# Patient Record
Sex: MALE
[De-identification: names, ages, dates, MRNs, and addresses within clinical notes are randomized per-mention and may not be internally consistent; named-entity substitution may affect disease eponyms.]

---

## 2017-02-13 ENCOUNTER — DIAGNOSTIC TRANS (OUTPATIENT)
Dept: OTHER | Age: 52
End: 2017-02-13

## 2017-02-13 ENCOUNTER — HOSPITAL (OUTPATIENT)
Dept: OTHER | Age: 52
End: 2017-02-13
Attending: HOSPITALIST

## 2017-02-13 LAB
ANALYZER ANC (IANC): ABNORMAL
ANION GAP SERPL CALC-SCNC: 13 MMOL/L (ref 10–20)
BASOPHILS # BLD: 0 THOUSAND/MCL (ref 0–0.3)
BASOPHILS NFR BLD: 0 %
BUN SERPL-MCNC: 17 MG/DL (ref 10–20)
BUN/CREAT SERPL: 19 (ref 7–25)
CALCIUM SERPL-MCNC: 8.9 MG/DL (ref 8.4–10.2)
CHLORIDE: 104 MMOL/L (ref 98–107)
CO2 SERPL-SCNC: 25 MMOL/L (ref 21–32)
CREAT SERPL-MCNC: 0.91 MG/DL (ref 0.67–1.17)
D DIMER PPP FEU-MCNC: 0.24 MG/L FEU
DIFFERENTIAL METHOD BLD: ABNORMAL
EOSINOPHIL # BLD: 0.2 THOUSAND/MCL (ref 0.1–0.5)
EOSINOPHIL NFR BLD: 2 %
ERYTHROCYTE [DISTWIDTH] IN BLOOD: 13.8 % (ref 11–15)
GLUCOSE BLDC GLUCOMTR-MCNC: 168 MG/DL (ref 65–99)
GLUCOSE BLDC GLUCOMTR-MCNC: 187 MG/DL (ref 65–99)
GLUCOSE BLDC GLUCOMTR-MCNC: 54 MG/DL (ref 65–99)
GLUCOSE BLDC GLUCOMTR-MCNC: 86 MG/DL (ref 65–99)
GLUCOSE SERPL-MCNC: 350 MG/DL (ref 65–99)
HEMATOCRIT: 44 % (ref 39–51)
HGB BLD-MCNC: 14.8 GM/DL (ref 13–17)
LYMPHOCYTES # BLD: 1.9 THOUSAND/MCL (ref 1–4)
LYMPHOCYTES NFR BLD: 27 %
MCH RBC QN AUTO: 34.6 PG (ref 26–34)
MCHC RBC AUTO-ENTMCNC: 33.6 GM/DL (ref 32–36.5)
MCV RBC AUTO: 102.8 FL (ref 78–100)
MONOCYTES # BLD: 0.6 THOUSAND/MCL (ref 0.3–0.9)
MONOCYTES NFR BLD: 8 %
NEUTROPHILS # BLD: 4.5 THOUSAND/MCL (ref 1.8–7.7)
NEUTROPHILS NFR BLD: 63 %
NEUTS SEG NFR BLD: ABNORMAL %
PERCENT NRBC: ABNORMAL
PLATELET # BLD: 168 THOUSAND/MCL (ref 140–450)
POTASSIUM SERPL-SCNC: 5 MMOL/L (ref 3.4–5.1)
RBC # BLD: 4.28 MILLION/MCL (ref 4.5–5.9)
SODIUM SERPL-SCNC: 137 MMOL/L (ref 135–145)
TROPONIN I SERPL HS-MCNC: <0.02 NG/ML
TROPONIN I SERPL HS-MCNC: <0.02 NG/ML
WBC # BLD: 7.2 THOUSAND/MCL (ref 4.2–11)

## 2017-02-14 ENCOUNTER — CHARTING TRANS (OUTPATIENT)
Dept: OTHER | Age: 52
End: 2017-02-14

## 2017-02-14 LAB
ANION GAP SERPL CALC-SCNC: 15 MMOL/L (ref 10–20)
BUN SERPL-MCNC: 18 MG/DL (ref 10–20)
BUN/CREAT SERPL: 21 (ref 7–25)
CALCIUM SERPL-MCNC: 8.8 MG/DL (ref 8.4–10.2)
CHLORIDE: 103 MMOL/L (ref 98–107)
CO2 SERPL-SCNC: 23 MMOL/L (ref 21–32)
CREAT SERPL-MCNC: 0.87 MG/DL (ref 0.67–1.17)
GLUCOSE BLDC GLUCOMTR-MCNC: 217 MG/DL (ref 65–99)
GLUCOSE BLDC GLUCOMTR-MCNC: 263 MG/DL (ref 65–99)
GLUCOSE SERPL-MCNC: 323 MG/DL (ref 65–99)
POTASSIUM SERPL-SCNC: 4.9 MMOL/L (ref 3.4–5.1)
SODIUM SERPL-SCNC: 136 MMOL/L (ref 135–145)
TROPONIN I SERPL HS-MCNC: <0.02 NG/ML

## 2017-08-08 ENCOUNTER — APPOINTMENT (OUTPATIENT)
Dept: GENERAL RADIOLOGY | Facility: HOSPITAL | Age: 52
End: 2017-08-08
Attending: EMERGENCY MEDICINE
Payer: MEDICARE

## 2017-08-08 ENCOUNTER — HOSPITAL ENCOUNTER (EMERGENCY)
Facility: HOSPITAL | Age: 52
Discharge: HOME | End: 2017-08-08
Attending: EMERGENCY MEDICINE
Payer: MEDICARE

## 2017-08-08 VITALS
OXYGEN SATURATION: 98 % | HEIGHT: 68 IN | DIASTOLIC BLOOD PRESSURE: 79 MMHG | SYSTOLIC BLOOD PRESSURE: 133 MMHG | HEART RATE: 89 BPM | RESPIRATION RATE: 16 BRPM | WEIGHT: 166 LBS | BODY MASS INDEX: 25.16 KG/M2

## 2017-08-08 DIAGNOSIS — S82.142A TIBIAL PLATEAU FRACTURE, LEFT, CLOSED, INITIAL ENCOUNTER: Primary | ICD-10-CM

## 2017-08-08 PROCEDURE — 73562 X-RAY EXAM OF KNEE 3: CPT | Performed by: EMERGENCY MEDICINE

## 2017-08-08 PROCEDURE — 99284 EMERGENCY DEPT VISIT MOD MDM: CPT

## 2017-08-08 PROCEDURE — 73610 X-RAY EXAM OF ANKLE: CPT | Performed by: EMERGENCY MEDICINE

## 2017-08-08 PROCEDURE — 99283 EMERGENCY DEPT VISIT LOW MDM: CPT

## 2017-08-08 PROCEDURE — 73630 X-RAY EXAM OF FOOT: CPT | Performed by: EMERGENCY MEDICINE

## 2017-08-08 RX ORDER — ACYCLOVIR 200 MG/1
200 CAPSULE ORAL 2 TIMES DAILY
COMMUNITY
End: 2021-03-22 | Stop reason: CLARIF

## 2017-08-08 RX ORDER — HYDROCODONE BITARTRATE AND ACETAMINOPHEN 5; 325 MG/1; MG/1
1 TABLET ORAL ONCE
Status: COMPLETED | OUTPATIENT
Start: 2017-08-08 | End: 2017-08-08

## 2017-08-08 RX ORDER — VENLAFAXINE 37.5 MG/1
37.5 TABLET ORAL NIGHTLY
Status: ON HOLD | COMMUNITY
End: 2021-03-23

## 2017-08-08 RX ORDER — PREDNISONE 1 MG/1
5 TABLET ORAL DAILY
COMMUNITY

## 2017-08-08 RX ORDER — HYDROCODONE BITARTRATE AND ACETAMINOPHEN 5; 325 MG/1; MG/1
1 TABLET ORAL EVERY 4 HOURS PRN
Qty: 20 TABLET | Refills: 0 | Status: SHIPPED | OUTPATIENT
Start: 2017-08-08 | End: 2017-08-18

## 2017-08-09 NOTE — ED PROVIDER NOTES
Patient Seen in: [facility] Emergency Department    History   Patient presents with:  Trauma (cardiovascular, musculoskeletal)    Stated Complaint: fell down approx 2 stairs, left knee/ankle/foot pain. No loc.      HPI    Patient is a pleasant 51-ye REFERRAL      Comment: hiat hernia/schatzki's ring. no hpylori. mild                duodenitis    Medications :   Dolutegravir Sodium (TIVICAY OR),  Take by mouth. acyclovir 200 MG Oral Cap,  Take 200 mg by mouth every 4 (four) hours while awake.    predn  during surgery when patient was 8   • Diabetes Sister    • Diabetes Brother        Smoking status: Never Smoker                                                              Smokeless tobacco: Never Used                      Alcohol use:  No Ankle (min 3 Views), Left (cpt=73610)    Result Date: 8/8/2017  PROCEDURE:  XR ANKLE (MIN 3 VIEWS), LEFT (CPT=73610)  TECHNIQUE:  Three views were obtained. COMPARISON:  None. INDICATIONS:  fell down approx 2 stairs, left knee/ankle/foot pain. No loc. of left knee pain. FINDINGS:   Suspicion for subtle fracture involving the proximal tibia.  On the lateral view, there is vertically oriented linear lucency projecting over the proximal posterior shaft of the tibia, and possible lucency over the tibial tomorrow. Call in AM for appointment      Medications Prescribed:  Current Discharge Medication List    START taking these medications    HYDROcodone-acetaminophen 5-325 MG Oral Tab  Take 1 tablet by mouth every 4 (four) hours as needed for Pain.   Qty: 20

## 2017-08-09 NOTE — ED NOTES
Knee immobilizer applied to left knee. Pt tolerated well. Pt assisted into wheelchair and assisted into car. D/C instructions reviewed, no questions.

## 2017-08-09 NOTE — ED INITIAL ASSESSMENT (HPI)
Pt states 30min prior to arrival, pt misstepped onto stairs and came down on left leg. Pt states he did not fall and no LOC. Pt c/o pain to left foot and knee.

## 2017-08-24 ENCOUNTER — APPOINTMENT (OUTPATIENT)
Dept: GENERAL RADIOLOGY | Facility: HOSPITAL | Age: 52
End: 2017-08-24
Attending: EMERGENCY MEDICINE
Payer: MEDICARE

## 2017-08-24 ENCOUNTER — HOSPITAL ENCOUNTER (EMERGENCY)
Facility: HOSPITAL | Age: 52
Discharge: HOME | End: 2017-08-24
Attending: EMERGENCY MEDICINE
Payer: MEDICARE

## 2017-08-24 VITALS
HEIGHT: 68 IN | OXYGEN SATURATION: 100 % | TEMPERATURE: 98 F | BODY MASS INDEX: 24.25 KG/M2 | WEIGHT: 160 LBS | HEART RATE: 82 BPM | RESPIRATION RATE: 18 BRPM | SYSTOLIC BLOOD PRESSURE: 146 MMHG | DIASTOLIC BLOOD PRESSURE: 86 MMHG

## 2017-08-24 DIAGNOSIS — S82.142D CLOSED FRACTURE OF LEFT TIBIAL PLATEAU WITH ROUTINE HEALING, SUBSEQUENT ENCOUNTER: ICD-10-CM
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
## 2017-08-24 PROCEDURE — 99283 EMERGENCY DEPT VISIT LOW MDM: CPT

## 2017-08-24 PROCEDURE — 73560 X-RAY EXAM OF KNEE 1 OR 2: CPT | Performed by: EMERGENCY MEDICINE

## 2017-08-24 NOTE — ED PROVIDER NOTES
Patient Seen in: [facility] Emergency Department    History   Patient presents with:  Lower Extremity Injury (musculoskeletal)    Stated Complaint:     HPI    26-year-old male who has a kidney transplant and 2 weeks ago suffered a left tibial platea duodenitis    Medications :   Dolutegravir Sodium (TIVICAY OR),  Take by mouth. acyclovir 200 MG Oral Cap,  Take 200 mg by mouth every 4 (four) hours while awake. predniSONE 5 MG Oral Tab,  Take 5 mg by mouth daily.    Venlafaxine HCl 37.5 MG Oral Used                      Alcohol use: No                Review of Systems    Positive for stated complaint:   Other systems are as noted in HPI. Constitutional and vital signs reviewed. All other systems reviewed and negative except as noted above. Medication List as of 8/24/2017 10:46 AM

## 2017-10-21 ENCOUNTER — HOSPITAL ENCOUNTER (EMERGENCY)
Facility: HOSPITAL | Age: 52
Discharge: HOME | End: 2017-10-21
Attending: EMERGENCY MEDICINE
Payer: MEDICARE

## 2017-10-21 VITALS
OXYGEN SATURATION: 96 % | TEMPERATURE: 98 F | HEART RATE: 69 BPM | WEIGHT: 155 LBS | RESPIRATION RATE: 16 BRPM | DIASTOLIC BLOOD PRESSURE: 78 MMHG | BODY MASS INDEX: 24.33 KG/M2 | SYSTOLIC BLOOD PRESSURE: 128 MMHG | HEIGHT: 67 IN

## 2017-10-21 DIAGNOSIS — R11.2 NAUSEA AND VOMITING IN ADULT: Primary | ICD-10-CM

## 2017-10-21 PROCEDURE — 80053 COMPREHEN METABOLIC PANEL: CPT | Performed by: EMERGENCY MEDICINE

## 2017-10-21 PROCEDURE — 99284 EMERGENCY DEPT VISIT MOD MDM: CPT

## 2017-10-21 PROCEDURE — 83690 ASSAY OF LIPASE: CPT | Performed by: EMERGENCY MEDICINE

## 2017-10-21 PROCEDURE — 85025 COMPLETE CBC W/AUTO DIFF WBC: CPT | Performed by: EMERGENCY MEDICINE

## 2017-10-21 PROCEDURE — 96361 HYDRATE IV INFUSION ADD-ON: CPT

## 2017-10-21 PROCEDURE — 96374 THER/PROPH/DIAG INJ IV PUSH: CPT

## 2017-10-21 RX ORDER — ONDANSETRON 2 MG/ML
4 INJECTION INTRAMUSCULAR; INTRAVENOUS ONCE
Status: COMPLETED | OUTPATIENT
Start: 2017-10-21 | End: 2017-10-21

## 2017-10-21 RX ORDER — ACETAMINOPHEN 500 MG
1000 TABLET ORAL ONCE
Status: COMPLETED | OUTPATIENT
Start: 2017-10-21 | End: 2017-10-21

## 2017-10-21 RX ORDER — MAGNESIUM OXIDE 400 MG (241.3 MG MAGNESIUM) TABLET
TABLET DAILY
COMMUNITY

## 2017-10-21 RX ORDER — ONDANSETRON 4 MG/1
4 TABLET, ORALLY DISINTEGRATING ORAL EVERY 4 HOURS PRN
Qty: 10 TABLET | Refills: 0 | Status: SHIPPED | OUTPATIENT
Start: 2017-10-21 | End: 2017-10-28

## 2017-10-21 NOTE — ED INITIAL ASSESSMENT (HPI)
Pt c/o nausea and 2 episodes of vomiting. Pt states he is unable to keep food down. Pt just finished vanco 1G and cipro 750mg. Pt was being treated for an infection in left leg surgical site. Pt denies fevers, or abd cramping.

## 2017-11-23 ENCOUNTER — HOSPITAL ENCOUNTER (INPATIENT)
Facility: HOSPITAL | Age: 52
LOS: 1 days | Discharge: HOME | DRG: 308 | End: 2017-11-24
Attending: EMERGENCY MEDICINE | Admitting: HOSPITALIST
Payer: MEDICARE

## 2017-11-23 ENCOUNTER — APPOINTMENT (OUTPATIENT)
Dept: GENERAL RADIOLOGY | Facility: HOSPITAL | Age: 52
DRG: 308 | End: 2017-11-23
Attending: EMERGENCY MEDICINE
Payer: MEDICARE

## 2017-11-23 DIAGNOSIS — I48.91 ATRIAL FIBRILLATION WITH RVR (HCC): ICD-10-CM

## 2017-11-23 DIAGNOSIS — E11.22 TYPE 2 DIABETES MELLITUS WITH CHRONIC KIDNEY DISEASE, WITHOUT LONG-TERM CURRENT USE OF INSULIN, UNSPECIFIED CKD STAGE (HCC): ICD-10-CM

## 2017-11-23 DIAGNOSIS — B20 HIV (HUMAN IMMUNODEFICIENCY VIRUS INFECTION) (HCC): ICD-10-CM

## 2017-11-23 DIAGNOSIS — G40.909 SEIZURE DISORDER (HCC): ICD-10-CM

## 2017-11-23 DIAGNOSIS — R07.9 ACUTE CHEST PAIN: Primary | ICD-10-CM

## 2017-11-23 DIAGNOSIS — Z94.0 S/P CADAVER RENAL TRANSPLANT: ICD-10-CM

## 2017-11-23 PROCEDURE — 99223 1ST HOSP IP/OBS HIGH 75: CPT | Performed by: HOSPITALIST

## 2017-11-23 PROCEDURE — 71010 XR CHEST AP PORTABLE  (CPT=71010): CPT | Performed by: EMERGENCY MEDICINE

## 2017-11-23 RX ORDER — ASPIRIN 325 MG
325 TABLET, DELAYED RELEASE (ENTERIC COATED) ORAL DAILY
Status: DISCONTINUED | OUTPATIENT
Start: 2017-11-23 | End: 2017-11-23

## 2017-11-23 RX ORDER — LEVETIRACETAM 250 MG/1
250 TABLET ORAL 2 TIMES DAILY
Status: DISCONTINUED | OUTPATIENT
Start: 2017-11-23 | End: 2017-11-24

## 2017-11-23 RX ORDER — CLINDAMYCIN HYDROCHLORIDE 300 MG/1
300 CAPSULE ORAL 3 TIMES DAILY
Status: ON HOLD | COMMUNITY
End: 2018-08-11

## 2017-11-23 RX ORDER — ASPIRIN 81 MG/1
324 TABLET, CHEWABLE ORAL ONCE
Status: COMPLETED | OUTPATIENT
Start: 2017-11-23 | End: 2017-11-23

## 2017-11-23 RX ORDER — ASPIRIN 325 MG
325 TABLET ORAL DAILY
Status: DISCONTINUED | OUTPATIENT
Start: 2017-11-23 | End: 2017-11-24

## 2017-11-23 RX ORDER — ASCORBIC ACID, THIAMINE, RIBOFLAVIN, NIACINAMIDE, PYRIDOXINE, FOLIC ACID, COBALAMIN, BIOTIN, PANTOTHENIC ACID 100; 1.5; 1.7; 20; 10; 1; 6; 300; 1 MG/1; MG/1; MG/1; MG/1; MG/1; MG/1; UG/1; UG/1; MG/1
1 TABLET, COATED ORAL
Status: DISCONTINUED | OUTPATIENT
Start: 2017-11-24 | End: 2017-11-24

## 2017-11-23 RX ORDER — TACROLIMUS 1 MG/1
3 CAPSULE ORAL EVERY MORNING
Status: DISCONTINUED | OUTPATIENT
Start: 2017-11-24 | End: 2017-11-24

## 2017-11-23 RX ORDER — CIPROFLOXACIN 750 MG/1
750 TABLET, FILM COATED ORAL 2 TIMES DAILY
Status: ON HOLD | COMMUNITY
End: 2018-08-11

## 2017-11-23 RX ORDER — VENLAFAXINE 37.5 MG/1
37.5 TABLET ORAL 2 TIMES DAILY
Status: DISCONTINUED | OUTPATIENT
Start: 2017-11-23 | End: 2017-11-24

## 2017-11-23 RX ORDER — DEXTROSE MONOHYDRATE 25 G/50ML
50 INJECTION, SOLUTION INTRAVENOUS
Status: DISCONTINUED | OUTPATIENT
Start: 2017-11-23 | End: 2017-11-24

## 2017-11-23 RX ORDER — PREDNISONE 1 MG/1
5 TABLET ORAL DAILY
Status: DISCONTINUED | OUTPATIENT
Start: 2017-11-23 | End: 2017-11-24

## 2017-11-23 RX ORDER — ACYCLOVIR 200 MG/1
200 CAPSULE ORAL
Status: DISCONTINUED | OUTPATIENT
Start: 2017-11-23 | End: 2017-11-23

## 2017-11-23 RX ORDER — DILTIAZEM HYDROCHLORIDE 5 MG/ML
10 INJECTION INTRAVENOUS
Status: DISPENSED | OUTPATIENT
Start: 2017-11-23 | End: 2017-11-23

## 2017-11-23 RX ORDER — ACYCLOVIR 200 MG/1
200 CAPSULE ORAL 2 TIMES DAILY
Status: DISCONTINUED | OUTPATIENT
Start: 2017-11-24 | End: 2017-11-24

## 2017-11-23 RX ORDER — MYCOPHENOLATE MOFETIL 250 MG/1
180 CAPSULE ORAL
Status: ON HOLD | COMMUNITY
End: 2018-08-11 | Stop reason: DRUGHIGH

## 2017-11-23 RX ORDER — MYCOPHENOLIC ACID 180 MG/1
540 TABLET, DELAYED RELEASE ORAL
Status: DISCONTINUED | OUTPATIENT
Start: 2017-11-23 | End: 2017-11-24

## 2017-11-23 RX ORDER — DILTIAZEM HCL-SODIUM CHLORIDE IV SOLN 125 MG/125ML-0.9% 125-0.9/125 MG/ML-%
SOLUTION INTRAVENOUS CONTINUOUS
Status: DISCONTINUED | OUTPATIENT
Start: 2017-11-23 | End: 2017-11-24

## 2017-11-23 RX ORDER — CLINDAMYCIN HYDROCHLORIDE 150 MG/1
300 CAPSULE ORAL 3 TIMES DAILY
Status: DISCONTINUED | OUTPATIENT
Start: 2017-11-23 | End: 2017-11-24

## 2017-11-23 RX ORDER — HEPARIN SODIUM 5000 [USP'U]/ML
60 INJECTION INTRAVENOUS; SUBCUTANEOUS ONCE
Status: COMPLETED | OUTPATIENT
Start: 2017-11-23 | End: 2017-11-23

## 2017-11-23 RX ORDER — HEPARIN SODIUM AND DEXTROSE 10000; 5 [USP'U]/100ML; G/100ML
INJECTION INTRAVENOUS CONTINUOUS
Status: DISCONTINUED | OUTPATIENT
Start: 2017-11-23 | End: 2017-11-24

## 2017-11-23 RX ORDER — SODIUM CHLORIDE 9 MG/ML
INJECTION, SOLUTION INTRAVENOUS CONTINUOUS
Status: DISCONTINUED | OUTPATIENT
Start: 2017-11-23 | End: 2017-11-23

## 2017-11-23 RX ORDER — ACETAMINOPHEN 325 MG/1
650 TABLET ORAL EVERY 6 HOURS PRN
Status: DISCONTINUED | OUTPATIENT
Start: 2017-11-23 | End: 2017-11-24

## 2017-11-23 RX ORDER — SODIUM POLYSTYRENE SULFONATE 15 G/60ML
15 SUSPENSION ORAL; RECTAL ONCE
Status: DISCONTINUED | OUTPATIENT
Start: 2017-11-23 | End: 2017-11-24

## 2017-11-23 RX ORDER — UBIDECARENONE 75 MG
100 CAPSULE ORAL DAILY
Status: DISCONTINUED | OUTPATIENT
Start: 2017-11-23 | End: 2017-11-24

## 2017-11-23 RX ORDER — NITROGLYCERIN 0.4 MG/1
0.4 TABLET SUBLINGUAL EVERY 5 MIN PRN
Status: DISCONTINUED | OUTPATIENT
Start: 2017-11-23 | End: 2017-11-24

## 2017-11-23 RX ORDER — PYRIDOXINE HCL (VITAMIN B6) 50 MG
TABLET ORAL
Status: ON HOLD | COMMUNITY
End: 2017-11-23

## 2017-11-23 RX ORDER — HEPARIN SODIUM AND DEXTROSE 10000; 5 [USP'U]/100ML; G/100ML
12 INJECTION INTRAVENOUS ONCE
Status: COMPLETED | OUTPATIENT
Start: 2017-11-23 | End: 2017-11-23

## 2017-11-23 RX ORDER — ZOLPIDEM TARTRATE 5 MG/1
5 TABLET ORAL NIGHTLY PRN
Status: DISCONTINUED | OUTPATIENT
Start: 2017-11-23 | End: 2017-11-24

## 2017-11-23 RX ORDER — DEXTROSE MONOHYDRATE 25 G/50ML
50 INJECTION, SOLUTION INTRAVENOUS
Status: DISCONTINUED | OUTPATIENT
Start: 2017-11-23 | End: 2017-11-23

## 2017-11-23 RX ORDER — ONDANSETRON 2 MG/ML
4 INJECTION INTRAMUSCULAR; INTRAVENOUS EVERY 4 HOURS PRN
Status: DISCONTINUED | OUTPATIENT
Start: 2017-11-23 | End: 2017-11-23

## 2017-11-23 RX ORDER — TACROLIMUS 1 MG/1
1 CAPSULE ORAL 2 TIMES DAILY
Status: DISCONTINUED | OUTPATIENT
Start: 2017-11-23 | End: 2017-11-23

## 2017-11-23 RX ORDER — ENOXAPARIN SODIUM 100 MG/ML
40 INJECTION SUBCUTANEOUS DAILY
Status: DISCONTINUED | OUTPATIENT
Start: 2017-11-23 | End: 2017-11-23

## 2017-11-23 RX ORDER — TACROLIMUS 1 MG/1
2 CAPSULE ORAL NIGHTLY
Status: DISCONTINUED | OUTPATIENT
Start: 2017-11-23 | End: 2017-11-24

## 2017-11-23 NOTE — PROGRESS NOTES
Dr. Yazan Nelson notified of pt with insulin pump. States to let pt manage it and she will see him tomorrow.

## 2017-11-23 NOTE — ED PROVIDER NOTES
Patient Seen in: [facility] Emergency Department    History   Patient presents with:  Chest Pain Angina (cardiovascular)    Stated Complaint: chest pain    HPI    59-year-old male with a previous history of atrial flutter in 2013, history of central extraction  No date: OTHER      Comment: right sided dialysis - right arm fistula  No date: OTHER      Comment: 2008 rt arm fistula placed  3/13/14: OTHER SURGICAL HISTORY      Comment: Right patellar tendon repair  10/2015: UPPER GI ENDOSCOPY - REFERRAL exam: Warm to touch. Good skin turgor. No lacerations, abrasions, lesions noted.   ED Course     Labs Reviewed   COMP METABOLIC PANEL (14) - Abnormal; Notable for the following:        Result Value    Glucose 231 (*)     Potassium 5.4 (*)     All other co established and labs were drawn.     The patient was administered Cardizem bolus 10 mg IV followed by drip 10 mg/h, 4 baby aspirin, heparin weight-based protocol medications for the purposes of treating  [chest pain, atrial fibrillation with rapid ventricul

## 2017-11-23 NOTE — H&P
[facility]  History and Physical     Bernell Filter Patient Status:  Emergency    1965 MRN [id number]   Location [facility] Attending Rudy Abraham MD   Hosp Day # 0 PCP Riley Baca     Chief Complaint: Chest pain a [facility])     on & off x6 yrs/ last one 2 yrs ago   • Seizures ([facility] [address] 2/6/2013   • Type II or unspecified type diabetes mellitus without mention of complication, not stated as uncontrolled 2/6/2013   • Unspecified essential hypertension 2/6/2013   • Unspecified times daily. Disp:  Rfl:    Glucose Blood (DANTE CONTOUR NEXT TEST) In Vitro Strip To test blood sugar 3 times daily.  ICD 10 Diagnosis: E11.9 Disp: 100 each Rfl: 5   dapsone 100 MG Oral Tab  Disp:  Rfl:    famoTIDine 20 MG Oral Tab  Disp:  Rfl:    Metoclop Chest and Back: No tenderness or deformity. Abdomen: Soft, nontender, nondistended. Positive bowel sounds. No rebound, guarding or organomegaly. Neurologic: No focal neurological deficits. CNII-XII grossly intact.   Musculoskeletal: Moves all extremiti

## 2017-11-24 ENCOUNTER — APPOINTMENT (OUTPATIENT)
Dept: CV DIAGNOSTICS | Facility: HOSPITAL | Age: 52
DRG: 308 | End: 2017-11-24
Attending: HOSPITALIST
Payer: MEDICARE

## 2017-11-24 VITALS
DIASTOLIC BLOOD PRESSURE: 70 MMHG | TEMPERATURE: 98 F | RESPIRATION RATE: 16 BRPM | SYSTOLIC BLOOD PRESSURE: 110 MMHG | BODY MASS INDEX: 24 KG/M2 | WEIGHT: 150.81 LBS | HEART RATE: 64 BPM | OXYGEN SATURATION: 96 %

## 2017-11-24 PROCEDURE — 93306 TTE W/DOPPLER COMPLETE: CPT | Performed by: HOSPITALIST

## 2017-11-24 PROCEDURE — 99239 HOSP IP/OBS DSCHRG MGMT >30: CPT | Performed by: HOSPITALIST

## 2017-11-24 NOTE — CONSULTS
[facility]  Cardiology Consultation    Melissa Carrillo Patient Status:  Inpatient    1965 MRN [id number]   [facility] 8NE-A Attending Chadd Masterson MD   Hosp Day # 1 PCP Saul Amador     Reason for Consultation:  Atrial fibrillation infection) ([facility] 2/6/2013   • Kaposi sarcoma ([facility] 2/6/2013    treated chemo in 1999-  no problem since   • Muscle weakness     fractured left tibia   • Other and unspecified hyperlipidemia    • Pneumonia due to organism     pcp pneumonia   • Right patella Oral, Q6H PRN  •  multivitamin (DIALYVITE - RENAL) tab 1 tablet, 1 tablet, Oral, Daily with breakfast  •  levETIRAcetam (KEPPRA) tab 250 mg, 250 mg, Oral, BID  •  predniSONE (DELTASONE) tab 5 mg, 5 mg, Oral, Daily  •  Venlafaxine HCl (EFFEXOR) tab 37.5 mg, Wt 150 lb 12.8 oz (68.4 kg)   SpO2 96%   BMI 23.62 kg/m²   Temp (24hrs), Av °F (36.7 °C), Min:97.4 °F (36.3 °C), Max:98.9 °F (37.2 °C)       Intake/Output Summary (Last 24 hours) at 17 0938  Last data filed at 17 0900   Gross per 24 hour CHOL/HDL RATIO Latest Ref Range: <4.97  2.26   NON HDL CHOL Latest Ref Range: <130 mg/dL 87   LDL Cholesterol Calc Latest Ref Range: <130 mg/dL 68     Imaging:  EKG 11/24/2017: afib     CXR 11/24/2017: CONCLUSION:  No acute cardiopulmonary process.  Dictate BB for rate control  -CHAD2 score one and with multiple co-morbidities and high risk of fall (already has sustained broken bones in both legs), risked risk/benefit of ASA vs. Other oral anticoagulation and has chosen to continue ASA only  -he will need fol

## 2017-11-24 NOTE — PROGRESS NOTES
11/24/17 1152   Clinical Encounter Type   Visited With Patient   Sacramental Encounters   Sacrament of Sick-Anointing Visiting  anointed patient   The patient was seen by Efrain Malave.  Received prayer, Scripture, support and Sacrament o

## 2017-11-24 NOTE — PROGRESS NOTES
11/24/17 0929   Clinical Encounter Type   Visited With Health care provider   Referral From Nurse   Referral To (Julissa Riggins.  Farooq Martell for [place] support as per the consult)

## 2017-11-24 NOTE — DISCHARGE PLANNING
NURSING DISCHARGE NOTE    Discharged Home via Wheelchair. Accompanied by Family member and Support staff  Belongings Taken by patient/family. Pt given discharge instructions at the bedside with the opportunity to ask questions as needed.  Escorted o

## 2017-11-24 NOTE — PAYOR COMM NOTE
--------------  DISCHARGE REVIEW    Payor: [employer] MEDICARE ADV PPO  Subscriber #:  [id number]  Authorization Number: [id number]    Admit date: 11/23/17  Admit time:  1509  Discharge Date: 11/24/2017 12:55 PM     Admitting Physician: Kristi Paulson MD  Attend

## 2017-11-24 NOTE — PROGRESS NOTES
[facility]  Progress Note     Angel Le Patient Status:  Inpatient    1965 MRN [id number]   [facility] 8NE-A Attending Melony López MD   Hosp Day # 1 PCP Loreto Washington     Chief Complaint: sob and palpitations    S: Patient Subcutaneous TID AC and HS   • tacrolimus  3 mg Oral QAM   • tacrolimus  2 mg Oral Nightly   • acyclovir  200 mg Oral BID   • Mycophenolate Sodium  540 mg Oral BID AC   • Sodium Polystyrene Sulfonate  15 g Oral Once   • Pjbpxgtcui-Hdwmkemi-Mwxbevl AF  1 ta

## 2017-11-24 NOTE — PAYOR COMM NOTE
--------------  ADMISSION REVIEW     Payor: [employer] MEDICARE ADV PPO  Subscriber #:  [id number]  Authorization Number: N/A    Admit date: 11/23/17  Admit time: 1509       Admitting Physician: Ainsley Reddy MD  Attending Physician:  Ainsley Reddy MD  Primary C 03/06/14- fell-   knee immobilizer- wheelchair   • Seizure ([facility] [address]     on & off x6 yrs/ last one 2 yrs ago   • Seizures ([facility] [address] 2/6/2013   • Type II or unspecified type diabetes mellitus without mention of complication, not stated as uncontrolled 2/6/2013   • Lungs: Clear to auscultation bilaterally. There is no audible wheezes, Rales, rhonchi. Abdomen: Soft, nontender, nondistended. There is bowel sounds throughout 4 quadrants. There is no guarding or rebound tenderness. Extremities:  There is no clubbing, Reading: Atrial fibrillation. Ventricular rate of 138. No acute ST elevations or depressions. Rate, axis, intervals are noted.   I agree with computer interpretation[TO.1]           ED Course as of Nov 23 1356  ------------------------------------------- Signed by Sarah Caballero MD on 11/23/2017  1:56 PM                        H&P - H&P Note      H&P signed by Angel Reagan MD at 11/23/2017  1:40 PM     Author:  Angel Reagan MD Service:  (none) Author Type:  Physician    Filed:  11/23/2017  1:40 PM Date of Ser No current facility-administered medications on file prior to encounter. Current Outpatient Prescriptions on File Prior to Encounter:  Vitamin B12 100 MCG Oral Tab Take 100 mcg by mouth daily.  Disp:  Rfl:    PTA Insulin via Pump Inject into the skin cont Pertinent positives and negatives noted in the HPI. Physical Exam:    /82   Pulse 101   Resp 22   SpO2 99%   General: No acute distress. Alert and oriented x 3. HEENT: Normocephalic atraumatic. Moist mucous membranes. EOM-I. PERRLA. Anicteric.   Braxton Parks · Regine: no    Plan of care discussed with patient and ED physician    Antwon Catherine MD  11/23/2017           MEDICATIONS ADMINISTERED IN LAST 1 DAY:  acetaminophen (TYLENOL) tab 650 mg     Date Action Dose Route User    11/24/2017 0448 Given 650 mg Oral Roxanna Usnottdzzy-Gpjaoyir-Ffqsyfh -25-25 MG TABS 1 tablet - Patient supplied     Date Action Dose Route User    11/23/2017 2302 Given 1 tablet Oral Keyur Weiner RN      INSULIN VIA INSULIN PUMP     Date Action Dose Route User    11/24/2017 0816 Self admi Date Action Dose Route User    11/24/2017 0813 Given 750 mg Oral Verito Barragan, PORTILLO    11/23/2017 2258 Given 750 mg Oral Gris Arreguin, PORTILLO        11/24/17 0607 -- 63 P R  86/60 -- -- MANUEL   11/24/17 0435 98 °F (36.7 °C) 69 16  86/64 94 % -- REMY   11/24

## 2017-11-24 NOTE — PROGRESS NOTES
Admitted from ER for Atrial fib. On cardizem drip at 10mg/h and heparin drip at 800 units/hr. .Denies chest pain. Tele Atrial fib with Hr . Denies dizziness/lightheadedness. Oriented to CTU. Bed locked and in low position.  Call light and phone in wilber

## 2017-11-30 NOTE — DISCHARGE SUMMARY
[facility]  DISCHARGE SUMMARY     Sharad Sender Patient Status:  Inpatient    1965 MRN [id number]   [facility] 8NE-A Attending No att. providers found   Hosp Day # 1 PCP Javier Hull     Date of Admission: 2017  Date of D Prescription details   acyclovir 200 MG Caps  Commonly known as:  ZOVIRAX      Take 200 mg by mouth 2 (two) times daily.    Refills:  0     ARTIFICIAL TEARS 0.4 % Soln  Generic drug:  Hypromellose      Place 1 drop into both eyes 4 (four) times daily as nee Take 37.5 mg by mouth 2 (two) times daily. Refills:  0     Vitamin B12 100 MCG Tabs  Commonly known as:  CYANOCOBALAMIN      Take by mouth daily. Pt does not know dose.    Refills:  0     Zolpidem Tartrate 5 MG Tabs  Commonly known as:  AMBIEN      Take 5

## 2018-06-14 PROCEDURE — 99282 EMERGENCY DEPT VISIT SF MDM: CPT

## 2018-06-15 ENCOUNTER — HOSPITAL ENCOUNTER (EMERGENCY)
Facility: HOSPITAL | Age: 53
Discharge: HOME | End: 2018-06-15
Attending: EMERGENCY MEDICINE
Payer: MEDICARE

## 2018-06-15 VITALS
SYSTOLIC BLOOD PRESSURE: 163 MMHG | WEIGHT: 149.94 LBS | HEART RATE: 78 BPM | DIASTOLIC BLOOD PRESSURE: 83 MMHG | BODY MASS INDEX: 23 KG/M2 | TEMPERATURE: 98 F | RESPIRATION RATE: 17 BRPM | OXYGEN SATURATION: 95 %

## 2018-06-15 DIAGNOSIS — T14.8XXA PUNCTURE WOUND: Primary | ICD-10-CM

## 2018-06-15 NOTE — ED PROVIDER NOTES
Patient Seen in: [facility] Emergency Department    History   Patient presents with:   Other    Stated Complaint: insulin pump problem     HPI    51-year-old male with a history of atrial fibrillation, history of insulin-dependent diabetes, history o diabetes mellitus without mention of complication, not stated as uncontrolled 2/6/2013   • Unspecified essential hypertension 2/6/2013   • Unspecified vitamin D deficiency 2/6/2013   • Visual impairment     scarring on left eye       Past Surgical History: nontender. Extremities: No evidence of deformity. No clubbing or cyanosis. Neuro: No focal deficit is noted.     ED Course   Labs Reviewed - No data to display    ED Course as of Pramod 15 0021  ------------------------------------------------------------  h

## 2018-06-15 NOTE — ED INITIAL ASSESSMENT (HPI)
Pt states he was changing his insulin site and \"there was a lot of bleeding, which is unusual\", no active bleeding at this time. Pt take aspirin daily.

## 2018-07-27 ENCOUNTER — HOSPITAL ENCOUNTER (EMERGENCY)
Facility: HOSPITAL | Age: 53
Discharge: HOME | End: 2018-07-27
Attending: EMERGENCY MEDICINE
Payer: MEDICARE

## 2018-07-27 ENCOUNTER — APPOINTMENT (OUTPATIENT)
Dept: CT IMAGING | Facility: HOSPITAL | Age: 53
End: 2018-07-27
Attending: PHYSICIAN ASSISTANT
Payer: MEDICARE

## 2018-07-27 VITALS
WEIGHT: 165 LBS | BODY MASS INDEX: 25.9 KG/M2 | OXYGEN SATURATION: 99 % | HEART RATE: 80 BPM | SYSTOLIC BLOOD PRESSURE: 140 MMHG | RESPIRATION RATE: 16 BRPM | HEIGHT: 67 IN | TEMPERATURE: 98 F | DIASTOLIC BLOOD PRESSURE: 80 MMHG

## 2018-07-27 DIAGNOSIS — R19.7 DIARRHEA, UNSPECIFIED TYPE: Primary | ICD-10-CM

## 2018-07-27 DIAGNOSIS — R10.32 ABDOMINAL PAIN, LEFT LOWER QUADRANT: ICD-10-CM

## 2018-07-27 LAB
ALBUMIN SERPL-MCNC: 3.6 G/DL (ref 3.5–4.8)
ALBUMIN/GLOB SERPL: 1.1 {RATIO} (ref 1–2)
ALP LIVER SERPL-CCNC: 102 U/L (ref 45–117)
ALT SERPL-CCNC: 51 U/L (ref 17–63)
ANION GAP SERPL CALC-SCNC: 8 MMOL/L (ref 0–18)
AST SERPL-CCNC: 40 U/L (ref 15–41)
BASOPHILS # BLD AUTO: 0.04 X10(3) UL (ref 0–0.1)
BASOPHILS NFR BLD AUTO: 0.5 %
BILIRUB SERPL-MCNC: 0.6 MG/DL (ref 0.1–2)
BUN BLD-MCNC: 14 MG/DL (ref 8–20)
BUN/CREAT SERPL: 13.2 (ref 10–20)
CALCIUM BLD-MCNC: 9.1 MG/DL (ref 8.3–10.3)
CHLORIDE SERPL-SCNC: 105 MMOL/L (ref 101–111)
CO2 SERPL-SCNC: 28 MMOL/L (ref 22–32)
CREAT BLD-MCNC: 1.06 MG/DL (ref 0.7–1.3)
EOSINOPHIL # BLD AUTO: 0.17 X10(3) UL (ref 0–0.3)
EOSINOPHIL NFR BLD AUTO: 2.1 %
ERYTHROCYTE [DISTWIDTH] IN BLOOD BY AUTOMATED COUNT: 13.1 % (ref 11.5–16)
GLOBULIN PLAS-MCNC: 3.3 G/DL (ref 2.5–3.7)
GLUCOSE BLD-MCNC: 168 MG/DL (ref 70–99)
HCT VFR BLD AUTO: 50.8 % (ref 37–53)
HGB BLD-MCNC: 17.5 G/DL (ref 13–17)
IMMATURE GRANULOCYTE COUNT: 0.05 X10(3) UL (ref 0–1)
IMMATURE GRANULOCYTE RATIO %: 0.6 %
LYMPHOCYTES # BLD AUTO: 1.82 X10(3) UL (ref 0.9–4)
LYMPHOCYTES NFR BLD AUTO: 22 %
M PROTEIN MFR SERPL ELPH: 6.9 G/DL (ref 6.1–8.3)
MCH RBC QN AUTO: 34 PG (ref 27–33.2)
MCHC RBC AUTO-ENTMCNC: 34.4 G/DL (ref 31–37)
MCV RBC AUTO: 98.6 FL (ref 80–99)
MONOCYTES # BLD AUTO: 0.6 X10(3) UL (ref 0.1–1)
MONOCYTES NFR BLD AUTO: 7.2 %
NEUTROPHIL ABS PRELIM: 5.61 X10 (3) UL (ref 1.3–6.7)
NEUTROPHILS # BLD AUTO: 5.61 X10(3) UL (ref 1.3–6.7)
NEUTROPHILS NFR BLD AUTO: 67.6 %
OSMOLALITY SERPL CALC.SUM OF ELEC: 296 MOSM/KG (ref 275–295)
PLATELET # BLD AUTO: 158 10(3)UL (ref 150–450)
POTASSIUM SERPL-SCNC: 4.3 MMOL/L (ref 3.6–5.1)
RBC # BLD AUTO: 5.15 X10(6)UL (ref 4.3–5.7)
RED CELL DISTRIBUTION WIDTH-SD: 46.5 FL (ref 35.1–46.3)
SODIUM SERPL-SCNC: 141 MMOL/L (ref 136–144)
WBC # BLD AUTO: 8.3 X10(3) UL (ref 4–13)

## 2018-07-27 PROCEDURE — 96360 HYDRATION IV INFUSION INIT: CPT

## 2018-07-27 PROCEDURE — 80053 COMPREHEN METABOLIC PANEL: CPT | Performed by: PHYSICIAN ASSISTANT

## 2018-07-27 PROCEDURE — 99284 EMERGENCY DEPT VISIT MOD MDM: CPT

## 2018-07-27 PROCEDURE — 85025 COMPLETE CBC W/AUTO DIFF WBC: CPT | Performed by: PHYSICIAN ASSISTANT

## 2018-07-27 PROCEDURE — 74177 CT ABD & PELVIS W/CONTRAST: CPT | Performed by: PHYSICIAN ASSISTANT

## 2018-07-27 PROCEDURE — 96361 HYDRATE IV INFUSION ADD-ON: CPT

## 2018-07-27 NOTE — ED INITIAL ASSESSMENT (HPI)
Patient complains of \"loose\" bowel movements for a couple days; he states that the bowel movement was \"watery\" today. He verbalizes concern because his mother is currently in the hospital with diarrhea.  He reports that his mother's stool was negative f

## 2018-07-27 NOTE — ED PROVIDER NOTES
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    • HIV (human immunodeficiency virus infection) ([facility] 2/6/2013   • Kaposi sarcoma ([facility] air)    Current:/80   Pulse 80   Temp 97.7 °F (36.5 °C) (Temporal)   Resp 16   Ht 170.2 cm (5' 7\")   Wt 74.8 kg   SpO2 99%   BMI 25.84 kg/m²         Physical Exam    Nursing notes and vital signs reviewed     General Appearance: alert and oriented x Please view results for these tests on the individual orders. URINALYSIS WITH CULTURE REFLEX   RAINBOW DRAW BLUE   RAINBOW DRAW LAVENDER   RAINBOW DRAW LIGHT GREEN   RAINBOW DRAW GOLD     Patient IV established, labs drawn.   Patient's CBC, CMP and CT (primary encounter diagnosis)  Abdominal pain, left lower quadrant    Disposition:  Discharge  7/27/2018  8:40 pm    Follow-up:  Abdirizak Rivera [address] [id number] [phone]    Schedule an appointment as soon as possible f

## 2018-08-11 ENCOUNTER — APPOINTMENT (OUTPATIENT)
Dept: GENERAL RADIOLOGY | Facility: HOSPITAL | Age: 53
DRG: 308 | End: 2018-08-11
Payer: MEDICARE

## 2018-08-11 ENCOUNTER — HOSPITAL ENCOUNTER (INPATIENT)
Facility: HOSPITAL | Age: 53
LOS: 1 days | Discharge: HOME | DRG: 308 | End: 2018-08-12
Admitting: HOSPITALIST
Payer: MEDICARE

## 2018-08-11 DIAGNOSIS — I48.91 ATRIAL FIBRILLATION WITH RAPID VENTRICULAR RESPONSE (HCC): Primary | ICD-10-CM

## 2018-08-11 LAB
ALBUMIN SERPL-MCNC: 3.9 G/DL (ref 3.5–4.8)
ALBUMIN/GLOB SERPL: 1.1 {RATIO} (ref 1–2)
ALP LIVER SERPL-CCNC: 113 U/L (ref 45–117)
ALT SERPL-CCNC: 57 U/L (ref 17–63)
ANION GAP SERPL CALC-SCNC: 5 MMOL/L (ref 0–18)
ANION GAP SERPL CALC-SCNC: 5 MMOL/L (ref 0–18)
APTT PPP: 26.9 SECONDS (ref 26.1–34.6)
AST SERPL-CCNC: 43 U/L (ref 15–41)
BASOPHILS # BLD AUTO: 0.02 X10(3) UL (ref 0–0.1)
BASOPHILS # BLD AUTO: 0.03 X10(3) UL (ref 0–0.1)
BASOPHILS NFR BLD AUTO: 0.3 %
BASOPHILS NFR BLD AUTO: 0.3 %
BILIRUB SERPL-MCNC: 0.7 MG/DL (ref 0.1–2)
BUN BLD-MCNC: 14 MG/DL (ref 8–20)
BUN BLD-MCNC: 14 MG/DL (ref 8–20)
BUN/CREAT SERPL: 13.5 (ref 10–20)
BUN/CREAT SERPL: 13.6 (ref 10–20)
CALCIUM BLD-MCNC: 8.9 MG/DL (ref 8.3–10.3)
CALCIUM BLD-MCNC: 9.2 MG/DL (ref 8.3–10.3)
CHLORIDE SERPL-SCNC: 106 MMOL/L (ref 101–111)
CHLORIDE SERPL-SCNC: 108 MMOL/L (ref 101–111)
CHOLEST SMN-MCNC: 177 MG/DL (ref ?–200)
CO2 SERPL-SCNC: 27 MMOL/L (ref 22–32)
CO2 SERPL-SCNC: 28 MMOL/L (ref 22–32)
CREAT BLD-MCNC: 1.03 MG/DL (ref 0.7–1.3)
CREAT BLD-MCNC: 1.04 MG/DL (ref 0.7–1.3)
EOSINOPHIL # BLD AUTO: 0.19 X10(3) UL (ref 0–0.3)
EOSINOPHIL # BLD AUTO: 0.2 X10(3) UL (ref 0–0.3)
EOSINOPHIL NFR BLD AUTO: 2.2 %
EOSINOPHIL NFR BLD AUTO: 2.6 %
ERYTHROCYTE [DISTWIDTH] IN BLOOD BY AUTOMATED COUNT: 13 % (ref 11.5–16)
ERYTHROCYTE [DISTWIDTH] IN BLOOD BY AUTOMATED COUNT: 13 % (ref 11.5–16)
EST. AVERAGE GLUCOSE BLD GHB EST-MCNC: 183 MG/DL (ref 68–126)
GLOBULIN PLAS-MCNC: 3.6 G/DL (ref 2.5–3.7)
GLUCOSE BLD-MCNC: 134 MG/DL (ref 70–99)
GLUCOSE BLD-MCNC: 155 MG/DL (ref 65–99)
GLUCOSE BLD-MCNC: 159 MG/DL (ref 65–99)
GLUCOSE BLD-MCNC: 171 MG/DL (ref 65–99)
GLUCOSE BLD-MCNC: 177 MG/DL (ref 65–99)
GLUCOSE BLD-MCNC: 211 MG/DL (ref 65–99)
GLUCOSE BLD-MCNC: 231 MG/DL (ref 70–99)
GLUCOSE BLD-MCNC: 60 MG/DL (ref 65–99)
GLUCOSE BLD-MCNC: 89 MG/DL (ref 65–99)
HAV IGM SER QL: 1.6 MG/DL (ref 1.8–2.5)
HBA1C MFR BLD HPLC: 8 % (ref ?–5.7)
HCT VFR BLD AUTO: 49.1 % (ref 37–53)
HCT VFR BLD AUTO: 50.5 % (ref 37–53)
HDLC SERPL-MCNC: 54 MG/DL (ref 40–59)
HGB BLD-MCNC: 17.2 G/DL (ref 13–17)
HGB BLD-MCNC: 17.8 G/DL (ref 13–17)
IMMATURE GRANULOCYTE COUNT: 0.03 X10(3) UL (ref 0–1)
IMMATURE GRANULOCYTE COUNT: 0.04 X10(3) UL (ref 0–1)
IMMATURE GRANULOCYTE RATIO %: 0.3 %
IMMATURE GRANULOCYTE RATIO %: 0.5 %
INR BLD: 0.95 (ref 0.9–1.1)
LDLC SERPL CALC-MCNC: 99 MG/DL (ref ?–100)
LYMPHOCYTES # BLD AUTO: 2.06 X10(3) UL (ref 0.9–4)
LYMPHOCYTES # BLD AUTO: 2.1 X10(3) UL (ref 0.9–4)
LYMPHOCYTES NFR BLD AUTO: 22.2 %
LYMPHOCYTES NFR BLD AUTO: 28.2 %
M PROTEIN MFR SERPL ELPH: 7.5 G/DL (ref 6.1–8.3)
MCH RBC QN AUTO: 34.1 PG (ref 27–33.2)
MCH RBC QN AUTO: 34.2 PG (ref 27–33.2)
MCHC RBC AUTO-ENTMCNC: 35 G/DL (ref 31–37)
MCHC RBC AUTO-ENTMCNC: 35.2 G/DL (ref 31–37)
MCV RBC AUTO: 97.1 FL (ref 80–99)
MCV RBC AUTO: 97.2 FL (ref 80–99)
MONOCYTES # BLD AUTO: 0.59 X10(3) UL (ref 0.1–1)
MONOCYTES # BLD AUTO: 0.7 X10(3) UL (ref 0.1–1)
MONOCYTES NFR BLD AUTO: 7.5 %
MONOCYTES NFR BLD AUTO: 7.9 %
NEUTROPHIL ABS PRELIM: 4.51 X10 (3) UL (ref 1.3–6.7)
NEUTROPHIL ABS PRELIM: 6.27 X10 (3) UL (ref 1.3–6.7)
NEUTROPHILS # BLD AUTO: 4.51 X10(3) UL (ref 1.3–6.7)
NEUTROPHILS # BLD AUTO: 6.27 X10(3) UL (ref 1.3–6.7)
NEUTROPHILS NFR BLD AUTO: 60.5 %
NEUTROPHILS NFR BLD AUTO: 67.5 %
NONHDLC SERPL-MCNC: 123 MG/DL (ref ?–130)
OSMOLALITY SERPL CALC.SUM OF ELEC: 288 MOSM/KG (ref 275–295)
OSMOLALITY SERPL CALC.SUM OF ELEC: 300 MOSM/KG (ref 275–295)
PHOSPHATE SERPL-MCNC: 3.1 MG/DL (ref 2.5–4.9)
PLATELET # BLD AUTO: 141 10(3)UL (ref 150–450)
PLATELET # BLD AUTO: 143 10(3)UL (ref 150–450)
POTASSIUM SERPL-SCNC: 4 MMOL/L (ref 3.6–5.1)
POTASSIUM SERPL-SCNC: 4.2 MMOL/L (ref 3.6–5.1)
PSA SERPL DL<=0.01 NG/ML-MCNC: 13.1 SECONDS (ref 12.4–14.7)
RBC # BLD AUTO: 5.05 X10(6)UL (ref 4.3–5.7)
RBC # BLD AUTO: 5.2 X10(6)UL (ref 4.3–5.7)
RED CELL DISTRIBUTION WIDTH-SD: 46.3 FL (ref 35.1–46.3)
RED CELL DISTRIBUTION WIDTH-SD: 46.4 FL (ref 35.1–46.3)
SODIUM SERPL-SCNC: 138 MMOL/L (ref 136–144)
SODIUM SERPL-SCNC: 141 MMOL/L (ref 136–144)
TRIGL SERPL-MCNC: 119 MG/DL (ref 30–149)
TROPONIN I SERPL-MCNC: <0.046 NG/ML (ref ?–0.05)
TSI SER-ACNC: 1.6 MIU/ML (ref 0.35–5.5)
VLDLC SERPL CALC-MCNC: 24 MG/DL (ref 0–30)
WBC # BLD AUTO: 7.5 X10(3) UL (ref 4–13)
WBC # BLD AUTO: 9.3 X10(3) UL (ref 4–13)

## 2018-08-11 PROCEDURE — 71045 X-RAY EXAM CHEST 1 VIEW: CPT

## 2018-08-11 PROCEDURE — 99223 1ST HOSP IP/OBS HIGH 75: CPT | Performed by: HOSPITALIST

## 2018-08-11 RX ORDER — MAGNESIUM OXIDE 400 MG (241.3 MG MAGNESIUM) TABLET
400 TABLET ONCE
Status: COMPLETED | OUTPATIENT
Start: 2018-08-11 | End: 2018-08-11

## 2018-08-11 RX ORDER — ASCORBIC ACID, THIAMINE, RIBOFLAVIN, NIACINAMIDE, PYRIDOXINE, FOLIC ACID, COBALAMIN, BIOTIN, PANTOTHENIC ACID 100; 1.5; 1.7; 20; 10; 1; 6; 300; 1 MG/1; MG/1; MG/1; MG/1; MG/1; MG/1; UG/1; UG/1; MG/1
1 TABLET, COATED ORAL
Status: DISCONTINUED | OUTPATIENT
Start: 2018-08-11 | End: 2018-08-12

## 2018-08-11 RX ORDER — TACROLIMUS 1 MG/1
2 CAPSULE ORAL NIGHTLY
Status: DISCONTINUED | OUTPATIENT
Start: 2018-08-11 | End: 2018-08-12

## 2018-08-11 RX ORDER — VENLAFAXINE 37.5 MG/1
37.5 TABLET ORAL 2 TIMES DAILY
Status: DISCONTINUED | OUTPATIENT
Start: 2018-08-11 | End: 2018-08-12

## 2018-08-11 RX ORDER — ASPIRIN 325 MG
325 TABLET, DELAYED RELEASE (ENTERIC COATED) ORAL DAILY
Status: DISCONTINUED | OUTPATIENT
Start: 2018-08-11 | End: 2018-08-12

## 2018-08-11 RX ORDER — METOCLOPRAMIDE HYDROCHLORIDE 5 MG/ML
10 INJECTION INTRAMUSCULAR; INTRAVENOUS EVERY 8 HOURS PRN
Status: DISCONTINUED | OUTPATIENT
Start: 2018-08-11 | End: 2018-08-12

## 2018-08-11 RX ORDER — ENOXAPARIN SODIUM 100 MG/ML
40 INJECTION SUBCUTANEOUS DAILY
Status: DISCONTINUED | OUTPATIENT
Start: 2018-08-11 | End: 2018-08-12

## 2018-08-11 RX ORDER — SODIUM CHLORIDE 9 MG/ML
INJECTION, SOLUTION INTRAVENOUS CONTINUOUS
Status: ACTIVE | OUTPATIENT
Start: 2018-08-11 | End: 2018-08-11

## 2018-08-11 RX ORDER — TACROLIMUS 1 MG/1
3 CAPSULE ORAL DAILY
Status: DISCONTINUED | OUTPATIENT
Start: 2018-08-11 | End: 2018-08-12

## 2018-08-11 RX ORDER — MYCOPHENOLIC ACID 180 MG/1
180 TABLET, DELAYED RELEASE ORAL
Status: DISCONTINUED | OUTPATIENT
Start: 2018-08-11 | End: 2018-08-11

## 2018-08-11 RX ORDER — DEXTROSE MONOHYDRATE 25 G/50ML
50 INJECTION, SOLUTION INTRAVENOUS
Status: DISCONTINUED | OUTPATIENT
Start: 2018-08-11 | End: 2018-08-12

## 2018-08-11 RX ORDER — POLYVINYL ALCOHOL 14 MG/ML
1 SOLUTION/ DROPS OPHTHALMIC 4 TIMES DAILY PRN
Status: DISCONTINUED | OUTPATIENT
Start: 2018-08-11 | End: 2018-08-12

## 2018-08-11 RX ORDER — CLINDAMYCIN HYDROCHLORIDE 150 MG/1
300 CAPSULE ORAL 3 TIMES DAILY
Status: DISCONTINUED | OUTPATIENT
Start: 2018-08-11 | End: 2018-08-11

## 2018-08-11 RX ORDER — ACYCLOVIR 200 MG/1
200 CAPSULE ORAL 2 TIMES DAILY
Status: DISCONTINUED | OUTPATIENT
Start: 2018-08-11 | End: 2018-08-12

## 2018-08-11 RX ORDER — MYCOPHENOLIC ACID 360 MG/1
360 TABLET, DELAYED RELEASE ORAL EVERY 12 HOURS
Status: DISCONTINUED | OUTPATIENT
Start: 2018-08-11 | End: 2018-08-12

## 2018-08-11 RX ORDER — DAPSONE 100 MG/1
100 TABLET ORAL DAILY
Status: DISCONTINUED | OUTPATIENT
Start: 2018-08-11 | End: 2018-08-12

## 2018-08-11 RX ORDER — MYCOPHENOLIC ACID 360 MG/1
360 TABLET, DELAYED RELEASE ORAL EVERY 12 HOURS
COMMUNITY

## 2018-08-11 RX ORDER — ACETAMINOPHEN 325 MG/1
650 TABLET ORAL EVERY 6 HOURS PRN
Status: DISCONTINUED | OUTPATIENT
Start: 2018-08-11 | End: 2018-08-11

## 2018-08-11 RX ORDER — PREDNISONE 1 MG/1
5 TABLET ORAL DAILY
Status: DISCONTINUED | OUTPATIENT
Start: 2018-08-11 | End: 2018-08-12

## 2018-08-11 RX ORDER — ONDANSETRON 2 MG/ML
4 INJECTION INTRAMUSCULAR; INTRAVENOUS EVERY 6 HOURS PRN
Status: DISCONTINUED | OUTPATIENT
Start: 2018-08-11 | End: 2018-08-12

## 2018-08-11 RX ORDER — DOCUSATE SODIUM 100 MG/1
100 CAPSULE, LIQUID FILLED ORAL NIGHTLY
COMMUNITY

## 2018-08-11 RX ORDER — ZOLPIDEM TARTRATE 5 MG/1
5 TABLET ORAL NIGHTLY PRN
Status: DISCONTINUED | OUTPATIENT
Start: 2018-08-11 | End: 2018-08-12

## 2018-08-11 RX ORDER — DEXTROSE MONOHYDRATE 25 G/50ML
50 INJECTION, SOLUTION INTRAVENOUS
Status: DISCONTINUED | OUTPATIENT
Start: 2018-08-11 | End: 2018-08-11

## 2018-08-11 RX ORDER — PURIFIED WATER 986 MG/ML
1 SOLUTION OPHTHALMIC AS NEEDED
Status: DISCONTINUED | OUTPATIENT
Start: 2018-08-11 | End: 2018-08-11

## 2018-08-11 RX ORDER — ACETAMINOPHEN 325 MG/1
650 TABLET ORAL EVERY 6 HOURS PRN
Status: DISCONTINUED | OUTPATIENT
Start: 2018-08-11 | End: 2018-08-12

## 2018-08-11 RX ORDER — UBIDECARENONE 75 MG
100 CAPSULE ORAL DAILY
Status: DISCONTINUED | OUTPATIENT
Start: 2018-08-11 | End: 2018-08-12

## 2018-08-11 RX ORDER — LEVETIRACETAM 250 MG/1
250 TABLET ORAL 2 TIMES DAILY
Status: DISCONTINUED | OUTPATIENT
Start: 2018-08-11 | End: 2018-08-12

## 2018-08-11 NOTE — CONSULTS
[facility]  Report of Consultation    Emmanuel Ellison Patient Status:  Inpatient    1965 MRN [id number]   [facility] 8NE-A Attending Fauzia Eastman MD   Hosp Day # 0 PCP Paulina Atwood     Reason for Consultation:  afib    History OTHER      Comment: tooth extraction  No date: OTHER      Comment: right sided dialysis - right arm fistula  No date: OTHER      Comment: 2008 rt arm fistula placed  3/13/14: OTHER SURGICAL HISTORY      Comment: Right patellar tendon repair  10/2015: UPPER tablet, 4 tablet, Oral, Q15 Min PRN **OR** dextrose 50 % injection 50 mL, 50 mL, Intravenous, Q15 Min PRN **OR** glucose (DEX4) oral liquid 30 g, 30 g, Oral, Q15 Min PRN **OR** Glucose-Vitamin C (DEX-4) 4-0.006 g chewable tab 8 tablet, 8 tablet, Oral, Q15 08/11/2018         Assessment/Plan:    1. Afib - percipitated by vomiting; rates well controlled on CCB drip; he is asymptomatic   -start beta blockers   -ECHO   -usually followed by [facility], ? consideration of anticoagulation  2.  Hx of bicuspid valve - rechec

## 2018-08-11 NOTE — ED PROVIDER NOTES
Patient Seen in: [facility] Emergency Department    History   Patient presents with:  Arrythmia/Palpitations (cardiovascular)    Stated Complaint: feels like heart is fluttering    HPI    This 24-year-old presents to the ER tonight, at about [facility] SURGICAL HISTORY      Comment: Right patellar tendon repair  10/2015: UPPER GI ENDOSCOPY - REFERRAL      Comment: hiat hernia/schatzki's ring.  no hpylori. mild                duodenitis        Smoking status: Never Smoker 141.0 (*)     MCH 34.2 (*)     RDW-SD 46.4 (*)     All other components within normal limits   TROPONIN I - Normal   PROTHROMBIN TIME (PT) - Normal   PTT, ACTIVATED - Normal   CBC WITH DIFFERENTIAL WITH PLATELET    Narrative:      The following orders were anticoagulate this patient since his symptoms just started        Disposition and Plan     Clinical Impression:  Atrial fibrillation with rapid ventricular response (HCC)  (primary encounter diagnosis)    Disposition:  There is no disposition on file for t

## 2018-08-11 NOTE — PLAN OF CARE
NURSING ADMISSION NOTE      Patient admitted via cart. Oriented to room. Safety precautions initiated. Bed in low position. Call light in reach. Admission navigator completed. Insulin pump consent signed and in chart.  Endo to be consulted in AM.

## 2018-08-11 NOTE — PLAN OF CARE
CARDIOVASCULAR - ADULT    • Absence of cardiac arrhythmias or at baseline Progressing        DISCHARGE PLANNING    • Discharge to home or other facility with appropriate resources Progressing        METABOLIC/FLUID AND ELECTROLYTES - ADULT    • Sandy Barriga

## 2018-08-11 NOTE — PLAN OF CARE
Pt admitted at 0330. Pt A/Ox4, VSS. Pt states mid chest achy pain of 2/10. Afib on tele. Insulin pump to RLQ, no s/s of infection, no redness or tenderness noted to site. Voids. cardizem gtt @ 5mg/hr. Pt updated on POC, verbalized understanding.  Call light

## 2018-08-11 NOTE — H&P
[facility]  History and Physical     Alma Gomez Patient Status:  Emergency    1965 MRN [id number]   Location [facility] Attending Alondra Glasgow MD   Hosp Day # 0 PCP Siva Patterson     Chief Complaint: Marybeth Gonzalez Past Surgical History:  No date: ANESTH,KIDNEY TRANSPLANT  No date: APPENDECTOMY  No date: AV FISTULA REVISION, OPEN  No date: OTHER      Comment: tooth extraction  No date: OTHER      Comment: right sided dialysis - right arm fistula  No date: OTHER mouth 2 (two) times daily. Disp:  Rfl:    Glucose Blood (DANTE CONTOUR NEXT TEST) In Vitro Strip To test blood sugar 3 times daily. ICD 10 Diagnosis: E11.9 Disp: 100 each Rfl: 5   dapsone 100 MG Oral Tab daily.    Disp:  Rfl:    Vqzdsqldrl-Uuvpvoet-Hjawlmq sounds. No rebound, guarding or organomegaly. Neurologic: No focal neurological deficits. CNII-XII grossly intact. Musculoskeletal: Moves all extremities. Extremities: trace edema LLE chronic, no cyanosis. Integument: No rashes or lesions.    Psychiatri

## 2018-08-11 NOTE — CONSULTS
ENDOCRINOLOGY CONSULTATION    Attending physician:  Jax Cao MD  Consulting physican:  Danilo Alcocer MD    Admission Date:  8/11/2018  Consultation Date:  8/11/2018      Reason for consultation: Mgmt of Type 2 DM    Chief Complaint:  Admitted f complications:    1. Cardio:   No CAD. No statin on med list. Admitted for atrial fib  2. Neuro:   No CVA or TIA  3.  Vascular: No PVD      All other review of systems is negative        Past Medical History:   Diagnosis Date   • Arrhythmia     atrial fib Rfl: PTA Insulin via Pump Inject into the skin continuous. Disp:  Rfl:    Dolutegravir Sodium (TIVICAY OR) Take by mouth nightly. Disp:  Rfl:    acyclovir 200 MG Oral Cap Take 200 mg by mouth 2 (two) times daily.    Disp:  Rfl:    predniSONE 5 MG Oral drop 1 drop Both Eyes QID PRN   levETIRAcetam (KEPPRA) tab 250 mg 250 mg Oral BID   mycophenolate (MYFORTIC) EC tab 180 mg Oral BID AC   predniSONE (DELTASONE) tab 5 mg 5 mg Oral Daily   tacrolimus (PROGRAF) cap 3 mg 3 mg Oral Daily   Venlafaxine HCl (EFFE RASH    Comment:Tolerated with pre-medication          Social History    Marital status: Single              Spouse name:                       Years of education:                 Number of children:             Social History Main Topics    Smok 22.0 - 32.0 mmol/L 28.0   ANION GAP      0 - 18 mmol/L 5   BUN      8 - 20 mg/dL 14   CREATININE      0.70 - 1.30 mg/dL 1.03   BUN/CREA RATIO      10.0 - 20.0 13.6   CALCIUM      8.3 - 10.3 mg/dL 8.9   CALCULATED OSMOLALITY      275 - 295 mOsm/kg 300 (H) MD  Endocrinology, Diabetes and Metabolism  [facility]

## 2018-08-12 ENCOUNTER — APPOINTMENT (OUTPATIENT)
Dept: CV DIAGNOSTICS | Facility: HOSPITAL | Age: 53
DRG: 308 | End: 2018-08-12
Attending: INTERNAL MEDICINE
Payer: MEDICARE

## 2018-08-12 VITALS
HEART RATE: 71 BPM | DIASTOLIC BLOOD PRESSURE: 73 MMHG | WEIGHT: 171.5 LBS | HEIGHT: 68 IN | BODY MASS INDEX: 25.99 KG/M2 | SYSTOLIC BLOOD PRESSURE: 121 MMHG | OXYGEN SATURATION: 95 % | RESPIRATION RATE: 18 BRPM | TEMPERATURE: 98 F

## 2018-08-12 LAB
ATRIAL RATE: 105 BPM
ATRIAL RATE: 163 BPM
GLUCOSE BLD-MCNC: 180 MG/DL (ref 65–99)
GLUCOSE BLD-MCNC: 199 MG/DL (ref 65–99)
GLUCOSE BLD-MCNC: 234 MG/DL (ref 65–99)
HAV IGM SER QL: 1.5 MG/DL (ref 1.8–2.5)
Q-T INTERVAL: 322 MS
Q-T INTERVAL: 352 MS
QRS DURATION: 88 MS
QRS DURATION: 92 MS
QTC CALCULATION (BEZET): 440 MS
QTC CALCULATION (BEZET): 489 MS
R AXIS: -37 DEGREES
R AXIS: -37 DEGREES
T AXIS: 64 DEGREES
T AXIS: 75 DEGREES
VENTRICULAR RATE: 139 BPM
VENTRICULAR RATE: 94 BPM

## 2018-08-12 PROCEDURE — 93306 TTE W/DOPPLER COMPLETE: CPT | Performed by: INTERNAL MEDICINE

## 2018-08-12 PROCEDURE — 99239 HOSP IP/OBS DSCHRG MGMT >30: CPT | Performed by: HOSPITALIST

## 2018-08-12 RX ORDER — DILTIAZEM HYDROCHLORIDE 120 MG/1
120 CAPSULE, COATED, EXTENDED RELEASE ORAL DAILY
Qty: 30 CAPSULE | Refills: 11 | Status: SHIPPED | OUTPATIENT
Start: 2018-08-13 | End: 2018-08-12

## 2018-08-12 RX ORDER — DILTIAZEM HYDROCHLORIDE 120 MG/1
120 CAPSULE, COATED, EXTENDED RELEASE ORAL DAILY
Qty: 30 CAPSULE | Refills: 11 | Status: SHIPPED | OUTPATIENT
Start: 2018-08-13 | End: 2019-11-26

## 2018-08-12 RX ORDER — DILTIAZEM HYDROCHLORIDE 120 MG/1
120 CAPSULE, EXTENDED RELEASE ORAL DAILY
Status: DISCONTINUED | OUTPATIENT
Start: 2018-08-12 | End: 2018-08-12

## 2018-08-12 RX ORDER — MAGNESIUM OXIDE 400 MG (241.3 MG MAGNESIUM) TABLET
800 TABLET ONCE
Status: COMPLETED | OUTPATIENT
Start: 2018-08-12 | End: 2018-08-12

## 2018-08-12 NOTE — PROGRESS NOTES
[facility]  Progress Note    Romain Ratesamir Patient Status:  Inpatient    1965 MRN [id number]   [facility] 8NE-A Attending Greta Mead MD   Hosp Day # 1 PCP Timothy Francis       Assessment and Plan:  Patient Active Problem List: (24hrs), Av.9 °F (36.6 °C), Min:97.4 °F (36.3 °C), Max:98.4 °F (36.9 °C)      Intake/Output:    Intake/Output Summary (Last 24 hours) at 18 1047  Last data filed at 18 1009   Gross per 24 hour   Intake              960 ml   Output Tartrate (AMBIEN) tab 5 mg 5 mg Oral Nightly PRN   glucose (DEX4) oral liquid 15 g 15 g Oral Q15 Min PRN   Or      Glucose-Vitamin C (DEX-4) 4-0.006 g chewable tab 4 tablet 4 tablet Oral Q15 Min PRN   Or      dextrose 50 % injection 50 mL 50 mL Intravenous

## 2018-08-12 NOTE — PLAN OF CARE
Patient is alert and oriented. Patient denies pain. Patient discharging home today. All physicians in agreement with discharge plan. Patients IV removed.  AVS/discharge paperwork reviewed with patient, prescription for cardizem given to patient per request

## 2018-08-12 NOTE — PROGRESS NOTES
[facility]  Progress Note     Hoang Gil Patient Status:  Inpatient    1965 MRN [id number]   [facility] 8NE-A Attending Lily Hunt MD   Hosp Day # 1 PCP Milton Ryan     Chief Complaint: Afib    S: Patient converted into Oral Daily   • Venlafaxine HCl  37.5 mg Oral BID   • Vitamin B12  100 mcg Oral Daily   • enoxaparin  40 mg Subcutaneous Daily   • tacrolimus  2 mg Oral Nightly   • Aetwahdapf-Obfbnyds-Zigeczh AF  1 tablet Oral Nightly   • Dolutegravir Sodium  50 mg Oral Ni

## 2018-08-12 NOTE — PLAN OF CARE
[facility]  [address] [address]  Dept: [phone]     WORK STATUS     Date & Time: 8/12/2018, 6:35 PM  Patient: Emmanuel Ellison  Encounter Provider(s):    MD Emmanuel Maradiaga was hospitalized

## 2018-08-12 NOTE — PROGRESS NOTES
ENDOCRINOLOGY PROGRESS NOTE    Typed by Luciano Benson MD on 8/12/2018      S:  Pt sitting up at the side of the bed eating dinner. If his 2DEcho is satisfactory, he might be discharged home today.       O:  /73 (BP Location: Left arm)   Pulse 7 nephropathy and moderate nonproliferative retinopathy: uncontrolled with hyperglycemia. Glucoses are mildly elevated today. HgA1C 8%  2. Long term insulin use   3.  Insulin pump status    · Continue regimen as follows:       Pump Name: Medtronic Minimed 122

## 2018-08-13 NOTE — DISCHARGE SUMMARY
[facility] HOSPITALIST  DISCHARGE SUMMARY     Trish Mccormack Patient Status:  Inpatient    1965 MRN [id number]   [facility] 8NE-A Attending No att. providers found   Hosp Day # 1 PCP [place]     Date of Admission: 2018  Date of Marija Roth · None    Consultants:  • Dr. Rafael Garcia, Dr. Deb De Los Santos    Discharge Medication List:     Discharge Medications      START taking these medications      Instructions Prescription details   DilTIAZem HCl ER Coated Beads 120 MG Cp24  Commonly known as:  CARDI TYLENOL 325 MG Tabs  Generic drug:  acetaminophen      Take 650 mg by mouth every 6 (six) hours as needed. Refills:  0     Venlafaxine HCl 37.5 MG Tabs  Commonly known as:  EFFEXOR      Take 37.5 mg by mouth nightly.    Refills:  0     Vitamin B12 100 M

## 2018-08-15 NOTE — PAYOR COMM NOTE
--------------  ADMISSION REVIEW     Payor: Andrew MULLINS PPO  Subscriber #:  [id number]  Authorization Number: [id number]    Admit date: 8/11/18  Admit time: [address]       Admitting Physician: Fauzia Eastman MD  Primary Care Physician: Carolyn Borja Comment: tooth extraction  No date: OTHER      Comment: right sided dialysis - right arm fistula  No date: OTHER      Comment: 2008 rt arm fistula placed  3/13/14: OTHER SURGICAL HISTORY      Comment: Right patellar tendon repair  10/2015: UPPER GI ENDOSCO 141.0 (*)     MCH 34.2 (*)     RDW-SD 46.4 (*)      EKG    Rate, intervals and axes as noted on EKG Report. Rate: 139  Rhythm: Atrial Fibrillation  Reading: Atrial fibrillation with rapid rate. Narrow complex. Prominent T waves.       Rate, intervals and HPI.    Physical Exam:    /84   Pulse 84   Temp 97.8 °F (36.6 °C) (Temporal)   Resp 20   Ht 172.7 cm (5' 8\")   Wt 165 lb (74.8 kg)   SpO2 99%   BMI 25.09 kg/m²    General: No acute distress. Alert and oriented x 3. HEENT: Normocephalic atraumatic. (36.5 °C), temperature source Oral, resp. rate 16, height 5' 8\" (1.727 m), weight 171 lb 8.3 oz (77.8 kg), SpO2 96 %. Temp (24hrs), Av.7 °F (36.5 °C), Min:97.7 °F (36.5 °C), Max:97.8 °F (36.6 °C)    Assessment/Plan:     1.  Afib - percipitated by vomi

## 2019-04-04 ENCOUNTER — HOSPITAL ENCOUNTER (EMERGENCY)
Facility: HOSPITAL | Age: 54
Discharge: HOME | End: 2019-04-05
Attending: STUDENT IN AN ORGANIZED HEALTH CARE EDUCATION/TRAINING PROGRAM
Payer: MEDICARE

## 2019-04-04 DIAGNOSIS — Z98.890 POST-OPERATIVE STATE: ICD-10-CM

## 2019-04-04 DIAGNOSIS — R11.2 NAUSEA AND VOMITING IN ADULT: Primary | ICD-10-CM

## 2019-04-04 PROCEDURE — 96375 TX/PRO/DX INJ NEW DRUG ADDON: CPT

## 2019-04-04 PROCEDURE — 80053 COMPREHEN METABOLIC PANEL: CPT | Performed by: STUDENT IN AN ORGANIZED HEALTH CARE EDUCATION/TRAINING PROGRAM

## 2019-04-04 PROCEDURE — S0028 INJECTION, FAMOTIDINE, 20 MG: HCPCS | Performed by: STUDENT IN AN ORGANIZED HEALTH CARE EDUCATION/TRAINING PROGRAM

## 2019-04-04 PROCEDURE — 85025 COMPLETE CBC W/AUTO DIFF WBC: CPT | Performed by: STUDENT IN AN ORGANIZED HEALTH CARE EDUCATION/TRAINING PROGRAM

## 2019-04-04 PROCEDURE — 84484 ASSAY OF TROPONIN QUANT: CPT | Performed by: STUDENT IN AN ORGANIZED HEALTH CARE EDUCATION/TRAINING PROGRAM

## 2019-04-04 PROCEDURE — 99285 EMERGENCY DEPT VISIT HI MDM: CPT

## 2019-04-04 PROCEDURE — 93010 ELECTROCARDIOGRAM REPORT: CPT

## 2019-04-04 PROCEDURE — 96361 HYDRATE IV INFUSION ADD-ON: CPT

## 2019-04-04 PROCEDURE — 96374 THER/PROPH/DIAG INJ IV PUSH: CPT

## 2019-04-04 PROCEDURE — 83690 ASSAY OF LIPASE: CPT | Performed by: STUDENT IN AN ORGANIZED HEALTH CARE EDUCATION/TRAINING PROGRAM

## 2019-04-04 RX ORDER — FAMOTIDINE 10 MG/ML
20 INJECTION, SOLUTION INTRAVENOUS ONCE
Status: COMPLETED | OUTPATIENT
Start: 2019-04-04 | End: 2019-04-04

## 2019-04-04 RX ORDER — ONDANSETRON 2 MG/ML
4 INJECTION INTRAMUSCULAR; INTRAVENOUS ONCE
Status: COMPLETED | OUTPATIENT
Start: 2019-04-04 | End: 2019-04-04

## 2019-04-04 RX ORDER — HYDROCODONE BITARTRATE AND ACETAMINOPHEN 5; 325 MG/1; MG/1
1 TABLET ORAL EVERY 6 HOURS PRN
Status: ON HOLD | COMMUNITY
End: 2021-03-23

## 2019-04-04 RX ORDER — POLYETHYLENE GLYCOL 3350 17 G/17G
17 POWDER, FOR SOLUTION ORAL DAILY
Status: ON HOLD | COMMUNITY
End: 2021-04-29

## 2019-04-05 VITALS
OXYGEN SATURATION: 95 % | HEART RATE: 92 BPM | TEMPERATURE: 98 F | BODY MASS INDEX: 26.52 KG/M2 | RESPIRATION RATE: 14 BRPM | DIASTOLIC BLOOD PRESSURE: 78 MMHG | WEIGHT: 175 LBS | HEIGHT: 68 IN | SYSTOLIC BLOOD PRESSURE: 145 MMHG

## 2019-04-05 PROCEDURE — 84132 ASSAY OF SERUM POTASSIUM: CPT | Performed by: STUDENT IN AN ORGANIZED HEALTH CARE EDUCATION/TRAINING PROGRAM

## 2019-04-05 PROCEDURE — 84450 TRANSFERASE (AST) (SGOT): CPT | Performed by: STUDENT IN AN ORGANIZED HEALTH CARE EDUCATION/TRAINING PROGRAM

## 2019-04-05 PROCEDURE — 93005 ELECTROCARDIOGRAM TRACING: CPT

## 2019-04-05 RX ORDER — ONDANSETRON 4 MG/1
4 TABLET, ORALLY DISINTEGRATING ORAL EVERY 8 HOURS PRN
Qty: 10 TABLET | Refills: 0 | Status: SHIPPED | OUTPATIENT
Start: 2019-04-05 | End: 2019-04-12

## 2019-04-05 NOTE — ED INITIAL ASSESSMENT (HPI)
Patient sts had a procedure done earlier today at the [place]- excision of anal condyloma. Vomiting- unable to keep anything down. Patient sts last time he vomited felt an extreme amount of heart burn.

## 2019-04-05 NOTE — ED PROVIDER NOTES
Patient Seen in: [facility] Emergency Department    History   Patient presents with:  Postop/Procedure Problem  Nausea/Vomiting/Diarrhea (gastrointestinal)    Stated Complaint: procedure today at [facility], nausea/vomiting                  Patient is a 48- 2/6/2013   • Visual impairment     scarring on left eye       Past Surgical History:   Procedure Laterality Date   • ANESTH,KIDNEY TRANSPLANT     • APPENDECTOMY     • AV FISTULA REVISION, OPEN     • ESOPHAGOGASTRODUODENOSCOPY (EGD) N/A 10/27/2015    Perfor murmur heard. Pulmonary/Chest: Effort normal. No respiratory distress. no wheezes. no rales. no tenderness. Abdominal: Soft. Bowel sounds are normal, no distension and no mass. There is no tenderness. There is no rebound and no guarding.    Musculoskele diagnosis was considered for the patient including appendicitis, gastroenteritis, postoperative vomiting, cholecystitis, pancreatitis, diverticulitis, urinary tract infection, perforated viscus, mesenteric ischemia, peptic ulcer disease, pyelonephritis, ne

## 2019-06-07 ENCOUNTER — HOSPITAL ENCOUNTER (EMERGENCY)
Facility: HOSPITAL | Age: 54
Discharge: HOME | End: 2019-06-07
Attending: STUDENT IN AN ORGANIZED HEALTH CARE EDUCATION/TRAINING PROGRAM
Payer: MEDICARE

## 2019-06-07 VITALS
HEART RATE: 67 BPM | BODY MASS INDEX: 27.28 KG/M2 | TEMPERATURE: 97 F | DIASTOLIC BLOOD PRESSURE: 73 MMHG | RESPIRATION RATE: 14 BRPM | SYSTOLIC BLOOD PRESSURE: 140 MMHG | OXYGEN SATURATION: 100 % | HEIGHT: 68 IN | WEIGHT: 180 LBS

## 2019-06-07 DIAGNOSIS — E16.2 HYPOGLYCEMIA: Primary | ICD-10-CM

## 2019-06-07 PROCEDURE — 93005 ELECTROCARDIOGRAM TRACING: CPT

## 2019-06-07 PROCEDURE — 84484 ASSAY OF TROPONIN QUANT: CPT | Performed by: STUDENT IN AN ORGANIZED HEALTH CARE EDUCATION/TRAINING PROGRAM

## 2019-06-07 PROCEDURE — 99284 EMERGENCY DEPT VISIT MOD MDM: CPT

## 2019-06-07 PROCEDURE — 80053 COMPREHEN METABOLIC PANEL: CPT | Performed by: STUDENT IN AN ORGANIZED HEALTH CARE EDUCATION/TRAINING PROGRAM

## 2019-06-07 PROCEDURE — 82962 GLUCOSE BLOOD TEST: CPT

## 2019-06-07 PROCEDURE — 36415 COLL VENOUS BLD VENIPUNCTURE: CPT

## 2019-06-07 PROCEDURE — 93010 ELECTROCARDIOGRAM REPORT: CPT

## 2019-06-07 PROCEDURE — 85025 COMPLETE CBC W/AUTO DIFF WBC: CPT | Performed by: STUDENT IN AN ORGANIZED HEALTH CARE EDUCATION/TRAINING PROGRAM
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
## 2019-06-08 NOTE — ED PROVIDER NOTES
Patient Seen in: [facility] Emergency Department    History   Patient presents with:  Hypoglycemia (metabolic)    Stated Complaint:     HPI    Patient is a 17-year-old male with history of insulin-dependent diabetes, hypertension, atrial fibrillatio History:   Procedure Laterality Date   • ANESTH,KIDNEY TRANSPLANT     • APPENDECTOMY     • AV FISTULA REVISION, OPEN     • ESOPHAGOGASTRODUODENOSCOPY (EGD) N/A 10/27/2015    Performed by Maggie Blue MD at [place] [address] wheezes. no rales. no tenderness. Abdominal: Soft. Bowel sounds are normal, no distension and no mass. There is no tenderness. There is no rebound and no guarding. Musculoskeletal: Normal range of motion, no edema.    Neurological: alert and oriented to will return for additional concerns.             Disposition and Plan     Clinical Impression:  Hypoglycemia  (primary encounter diagnosis)    Disposition:  Discharge  6/7/2019 11:20 pm    Follow-up:  Shayla Waggoner

## 2019-09-02 ENCOUNTER — HOSPITAL ENCOUNTER (OUTPATIENT)
Age: 54
Discharge: HOME | End: 2019-09-02
Attending: FAMILY MEDICINE
Payer: MEDICARE

## 2019-09-02 VITALS
TEMPERATURE: 97 F | DIASTOLIC BLOOD PRESSURE: 89 MMHG | HEART RATE: 68 BPM | RESPIRATION RATE: 16 BRPM | SYSTOLIC BLOOD PRESSURE: 151 MMHG | OXYGEN SATURATION: 95 %

## 2019-09-02 DIAGNOSIS — H10.32 ACUTE BACTERIAL CONJUNCTIVITIS OF LEFT EYE: ICD-10-CM

## 2019-09-02 DIAGNOSIS — H16.002 ULCER OF LEFT CORNEA: Primary | ICD-10-CM

## 2019-09-02 PROCEDURE — 99204 OFFICE O/P NEW MOD 45 MIN: CPT

## 2019-09-02 PROCEDURE — 99213 OFFICE O/P EST LOW 20 MIN: CPT

## 2019-09-02 RX ORDER — MOXIFLOXACIN 5 MG/ML
1 SOLUTION/ DROPS OPHTHALMIC 3 TIMES DAILY
Qty: 1 BOTTLE | Refills: 0 | Status: SHIPPED | OUTPATIENT
Start: 2019-09-02 | End: 2019-09-07

## 2019-09-02 RX ORDER — ERYTHROMYCIN 5 MG/G
1 OINTMENT OPHTHALMIC NIGHTLY
Qty: 1 TUBE | Refills: 0 | Status: SHIPPED | OUTPATIENT
Start: 2019-09-02 | End: 2019-09-07

## 2019-09-02 NOTE — ED PROVIDER NOTES
Patient Seen in: [address]    History   Patient presents with:   Eye Visual Problem (opthalmic)    Stated Complaint: left eye pain x1 day     HPI  59-year-old gentleman with a complex medical history of insulin-dependent di Visual impairment     scarring on left eye              Past Surgical History:   Procedure Laterality Date   • ANESTH,KIDNEY TRANSPLANT     • APPENDECTOMY     • AV FISTULA REVISION, OPEN     • ESOPHAGOGASTRODUODENOSCOPY (EGD) N/A 10/27/2015    Performed by secretions strands on the nasal and the temporal side  Upper eyelid is mildly inflamed  No foreign body seen in the upper or the lower tarsus  Visual acuity only to hand perception in left eye   Neck: Normal range of motion. Neck supple.  No thyromegaly pre medications    erythromycin 5 MG/GM Ophthalmic Ointment  Place 1 Application into the left eye nightly for 5 days.   Qty: 1 Tube Refills: 0    Moxifloxacin HCl (VIGAMOX) 0.5 % Ophthalmic Solution  Place 1 drop into the left eye 3 (three) times daily for 5 d

## 2019-09-02 NOTE — ED INITIAL ASSESSMENT (HPI)
Pt states that it feels like there is something in his left eye which started yesterday. Pt is having a difficult time keeping his eye open.

## 2019-11-23 ENCOUNTER — APPOINTMENT (OUTPATIENT)
Dept: GENERAL RADIOLOGY | Facility: HOSPITAL | Age: 54
End: 2019-11-23
Payer: OTHER MISCELLANEOUS

## 2019-11-23 ENCOUNTER — HOSPITAL ENCOUNTER (EMERGENCY)
Facility: HOSPITAL | Age: 54
Discharge: HOME | End: 2019-11-23
Attending: EMERGENCY MEDICINE
Payer: OTHER MISCELLANEOUS

## 2019-11-23 VITALS
WEIGHT: 181 LBS | BODY MASS INDEX: 27.43 KG/M2 | TEMPERATURE: 99 F | DIASTOLIC BLOOD PRESSURE: 78 MMHG | RESPIRATION RATE: 16 BRPM | OXYGEN SATURATION: 94 % | HEART RATE: 74 BPM | SYSTOLIC BLOOD PRESSURE: 157 MMHG | HEIGHT: 68 IN

## 2019-11-23 DIAGNOSIS — S93.401A SPRAIN OF RIGHT ANKLE, UNSPECIFIED LIGAMENT, INITIAL ENCOUNTER: Primary | ICD-10-CM

## 2019-11-23 DIAGNOSIS — S80.212A ABRASION OF LEFT KNEE, INITIAL ENCOUNTER: ICD-10-CM

## 2019-11-23 PROCEDURE — 73630 X-RAY EXAM OF FOOT: CPT | Performed by: EMERGENCY MEDICINE

## 2019-11-23 PROCEDURE — 99283 EMERGENCY DEPT VISIT LOW MDM: CPT

## 2019-11-23 PROCEDURE — 73610 X-RAY EXAM OF ANKLE: CPT | Performed by: EMERGENCY MEDICINE

## 2019-11-23 RX ORDER — ACETAMINOPHEN 325 MG/1
650 TABLET ORAL ONCE
Status: COMPLETED | OUTPATIENT
Start: 2019-11-23 | End: 2019-11-23

## 2019-11-23 RX ORDER — TRAMADOL HYDROCHLORIDE 50 MG/1
TABLET ORAL EVERY 6 HOURS PRN
Qty: 20 TABLET | Refills: 0 | Status: SHIPPED | OUTPATIENT
Start: 2019-11-23 | End: 2019-11-30

## 2019-11-24 NOTE — ED NOTES
Air Gel splint was applied to pt's right ankle. Ankle and foot were wrapped with an ace wrap prior to applying splint. Crutches were given to pt and care instructions were given.  Pt had a couple questions for staff and the questions were clarified and pt h

## 2019-11-24 NOTE — ED PROVIDER NOTES
Patient Seen in: [facility] Emergency Department      History   Patient presents with:  Lower Extremity Injury (musculoskeletal)    Stated Complaint: ankle pain    HPI    77-year-old male past medical history of HIV, ESRD status post renal transplan Unspecified vitamin D deficiency 2/6/2013   • Visual impairment     scarring on left eye              Past Surgical History:   Procedure Laterality Date   • ANESTH,KIDNEY TRANSPLANT     • APPENDECTOMY     • AV FISTULA REVISION, OPEN     • ESOPHAGOGASTRODUO Pulmonary:      Effort: Pulmonary effort is normal.      Breath sounds: Normal breath sounds. Abdominal:      General: Bowel sounds are normal.      Palpations: Abdomen is soft. Musculoskeletal:         General: No deformity.    Skin:     General: Ski onto his right foot until cleared by orthopedic surgery follow-up with occupational health on Monday as well as orthopedic surgery. Patient shows understanding of plan and is agreeable to plan discharged home in improved condition.               Dispositio

## 2019-12-16 ENCOUNTER — HOSPITAL ENCOUNTER (EMERGENCY)
Facility: HOSPITAL | Age: 54
Discharge: HOME | End: 2019-12-16
Attending: EMERGENCY MEDICINE
Payer: MEDICARE

## 2019-12-16 VITALS
DIASTOLIC BLOOD PRESSURE: 70 MMHG | WEIGHT: 174 LBS | HEIGHT: 68 IN | OXYGEN SATURATION: 98 % | SYSTOLIC BLOOD PRESSURE: 135 MMHG | BODY MASS INDEX: 26.37 KG/M2 | HEART RATE: 79 BPM | RESPIRATION RATE: 16 BRPM | TEMPERATURE: 98 F

## 2019-12-16 DIAGNOSIS — K52.9 GASTROENTERITIS: Primary | ICD-10-CM

## 2019-12-16 PROCEDURE — 87046 STOOL CULTR AEROBIC BACT EA: CPT | Performed by: EMERGENCY MEDICINE

## 2019-12-16 PROCEDURE — 87272 CRYPTOSPORIDIUM AG IF: CPT | Performed by: EMERGENCY MEDICINE

## 2019-12-16 PROCEDURE — 87045 FECES CULTURE AEROBIC BACT: CPT | Performed by: EMERGENCY MEDICINE

## 2019-12-16 PROCEDURE — 96361 HYDRATE IV INFUSION ADD-ON: CPT

## 2019-12-16 PROCEDURE — 96374 THER/PROPH/DIAG INJ IV PUSH: CPT

## 2019-12-16 PROCEDURE — 82962 GLUCOSE BLOOD TEST: CPT

## 2019-12-16 PROCEDURE — 87329 GIARDIA AG IA: CPT | Performed by: EMERGENCY MEDICINE

## 2019-12-16 PROCEDURE — 87427 SHIGA-LIKE TOXIN AG IA: CPT | Performed by: EMERGENCY MEDICINE

## 2019-12-16 PROCEDURE — 80053 COMPREHEN METABOLIC PANEL: CPT | Performed by: EMERGENCY MEDICINE

## 2019-12-16 PROCEDURE — 85025 COMPLETE CBC W/AUTO DIFF WBC: CPT | Performed by: EMERGENCY MEDICINE

## 2019-12-16 PROCEDURE — 99285 EMERGENCY DEPT VISIT HI MDM: CPT

## 2019-12-16 PROCEDURE — 87493 C DIFF AMPLIFIED PROBE: CPT | Performed by: EMERGENCY MEDICINE

## 2019-12-16 PROCEDURE — 82272 OCCULT BLD FECES 1-3 TESTS: CPT | Performed by: EMERGENCY MEDICINE

## 2019-12-16 PROCEDURE — 99284 EMERGENCY DEPT VISIT MOD MDM: CPT

## 2019-12-16 RX ORDER — ONDANSETRON 4 MG/1
4 TABLET, ORALLY DISINTEGRATING ORAL EVERY 4 HOURS PRN
Qty: 10 TABLET | Refills: 0 | Status: SHIPPED | OUTPATIENT
Start: 2019-12-16 | End: 2019-12-23

## 2019-12-16 RX ORDER — GLIMEPIRIDE 2 MG/1
1 TABLET ORAL 2 TIMES DAILY
Status: ON HOLD | COMMUNITY
End: 2021-04-29

## 2019-12-16 RX ORDER — ONDANSETRON 2 MG/ML
4 INJECTION INTRAMUSCULAR; INTRAVENOUS ONCE
Status: COMPLETED | OUTPATIENT
Start: 2019-12-16 | End: 2019-12-16

## 2019-12-16 RX ORDER — ONDANSETRON 4 MG/1
TABLET, ORALLY DISINTEGRATING ORAL
Status: COMPLETED
Start: 2019-12-16 | End: 2019-12-16

## 2019-12-16 NOTE — ED NOTES
Pt denies pain or nausea, asking for lunch tray, stool sample obtained and sent, a/o x3 vss, no distress noted

## 2019-12-16 NOTE — ED PROVIDER NOTES
Patient Seen in: [facility] Emergency Department      History   Patient presents with:  Nausea/Vomiting/Diarrhea    Stated Complaint: n/v/d    HPI    55-year-old male complaining of vomiting diarrhea.   The patient states this started last night a fe OPEN     • ESOPHAGOGASTRODUODENOSCOPY (EGD) N/A 10/27/2015    Performed by Anahi Alvarez MD at [address]      right kidney   • OTHER      tooth extraction   • OTHER      right sided dialysis - right arm fistula   • for the following components:    POC Glucose 133 (*)     All other components within normal limits   OCCULT BLOOD, STOOL - Abnormal; Notable for the following components:    Occult Blood Result Positive for Occult Blood (*)     All other components within appeared well in the emergency department I believe is viral gastroenteritis advised follow-up doctor next 2 days return any problems he was able to give a stool sample.               Disposition and Plan     Clinical Impression:  Gastroenteritis  (primary

## 2020-01-21 PROBLEM — S92.151D CLOSED DISPLACED AVULSION FRACTURE OF RIGHT TALUS WITH ROUTINE HEALING, SUBSEQUENT ENCOUNTER: Status: ACTIVE | Noted: 2020-01-21

## 2020-02-16 ENCOUNTER — APPOINTMENT (OUTPATIENT)
Dept: CT IMAGING | Facility: HOSPITAL | Age: 55
End: 2020-02-16
Attending: EMERGENCY MEDICINE
Payer: OTHER MISCELLANEOUS

## 2020-02-16 ENCOUNTER — APPOINTMENT (OUTPATIENT)
Dept: GENERAL RADIOLOGY | Facility: HOSPITAL | Age: 55
End: 2020-02-16
Attending: EMERGENCY MEDICINE
Payer: OTHER MISCELLANEOUS

## 2020-02-16 ENCOUNTER — HOSPITAL ENCOUNTER (EMERGENCY)
Facility: HOSPITAL | Age: 55
Discharge: HOME | End: 2020-02-16
Attending: EMERGENCY MEDICINE
Payer: OTHER MISCELLANEOUS

## 2020-02-16 VITALS
DIASTOLIC BLOOD PRESSURE: 83 MMHG | HEIGHT: 68 IN | BODY MASS INDEX: 25.76 KG/M2 | TEMPERATURE: 98 F | RESPIRATION RATE: 18 BRPM | SYSTOLIC BLOOD PRESSURE: 161 MMHG | HEART RATE: 79 BPM | OXYGEN SATURATION: 100 % | WEIGHT: 170 LBS

## 2020-02-16 DIAGNOSIS — S92.515A CLOSED NONDISPLACED FRACTURE OF PROXIMAL PHALANX OF LESSER TOE OF LEFT FOOT, INITIAL ENCOUNTER: ICD-10-CM

## 2020-02-16 DIAGNOSIS — S83.92XA SPRAIN OF LEFT KNEE, UNSPECIFIED LIGAMENT, INITIAL ENCOUNTER: ICD-10-CM

## 2020-02-16 DIAGNOSIS — S09.90XA INJURY OF HEAD, INITIAL ENCOUNTER: Primary | ICD-10-CM

## 2020-02-16 DIAGNOSIS — S93.402A SPRAIN OF LEFT ANKLE, UNSPECIFIED LIGAMENT, INITIAL ENCOUNTER: ICD-10-CM

## 2020-02-16 PROCEDURE — 73560 X-RAY EXAM OF KNEE 1 OR 2: CPT | Performed by: EMERGENCY MEDICINE

## 2020-02-16 PROCEDURE — 99284 EMERGENCY DEPT VISIT MOD MDM: CPT

## 2020-02-16 PROCEDURE — 73630 X-RAY EXAM OF FOOT: CPT | Performed by: EMERGENCY MEDICINE

## 2020-02-16 PROCEDURE — 28510 TREATMENT OF TOE FRACTURE: CPT

## 2020-02-16 PROCEDURE — 73610 X-RAY EXAM OF ANKLE: CPT | Performed by: EMERGENCY MEDICINE

## 2020-02-16 PROCEDURE — 70450 CT HEAD/BRAIN W/O DYE: CPT | Performed by: EMERGENCY MEDICINE

## 2020-02-16 NOTE — ED INITIAL ASSESSMENT (HPI)
Pt presents to ER status post slip and fall on ice yesterday while at work. Pt states that he did hit his head, c/o left knee and ankle pain. Pt is moving all extremities. Pt is a&ox3. Skin intact. Swelling noted around left ankle.

## 2020-02-16 NOTE — ED PROVIDER NOTES
Patient Seen in: [facility] Emergency Department      History   Patient presents with:  Fall    Stated Complaint: fall on ice yesterday, denies hitting head, L foot pain     HPI    This is a 22-year-old male who was working at [employer].   The patient ANESTH,KIDNEY TRANSPLANT     • APPENDECTOMY     • AV FISTULA REVISION, OPEN     • ESOPHAGOGASTRODUODENOSCOPY (EGD) N/A 10/27/2015    Performed by Phuc Leonardo MD at [address]      right kidney   • OTHER      tooth neck is supple. No midline neck tenderness. LUNGS: Clear to auscultation, there is no wheezing or retraction. No crackles. CV: Cardiovascular is regular without murmurs or rubs. ABD: The abdomen is soft nondistended nontender.   There is no rebound lateral views were obtained. COMPARISON:  [facility] , XR, XR ANKLE (MIN 3 VIEWS), LEFT (CPT=73610), 2/16/2020, 12:04. [facility] , XR, XR FOOT, COMPLETE (MIN 3 VIEWS), LEFT (CPT=73630), 8/08/2017, 21:17.   INDICATIONS:  fall on ice yesterday, denies hitting head aspect of the proximal tibia. 3. Mixed sclerotic lesion within the distal femur may represent AVN versus enchondroma. 4. Osteoarthritic changes.     Dictated by: Mary Gandara MD on 2/16/2020 at 12:37     Approved by: Mary Gandara MD on 2/16/2020 at Impression:  Injury of head, initial encounter  (primary encounter diagnosis)  Closed nondisplaced fracture of proximal phalanx of lesser toe of left foot, initial encounter  Sprain of left ankle, unspecified ligament, initial encounter  Sprain of left kne

## 2020-03-18 ENCOUNTER — HOSPITAL ENCOUNTER (EMERGENCY)
Facility: HOSPITAL | Age: 55
Discharge: HOME | End: 2020-03-19
Attending: EMERGENCY MEDICINE
Payer: MEDICARE

## 2020-03-18 VITALS
WEIGHT: 175 LBS | DIASTOLIC BLOOD PRESSURE: 80 MMHG | BODY MASS INDEX: 26.52 KG/M2 | HEIGHT: 68 IN | HEART RATE: 66 BPM | OXYGEN SATURATION: 99 % | SYSTOLIC BLOOD PRESSURE: 158 MMHG | RESPIRATION RATE: 18 BRPM

## 2020-03-18 DIAGNOSIS — E16.2 HYPOGLYCEMIA: Primary | ICD-10-CM

## 2020-03-18 LAB
ALBUMIN SERPL-MCNC: 3.7 G/DL (ref 3.4–5)
ALBUMIN/GLOB SERPL: 0.9 {RATIO} (ref 1–2)
ALP LIVER SERPL-CCNC: 111 U/L (ref 45–117)
ALT SERPL-CCNC: 28 U/L (ref 16–61)
ANION GAP SERPL CALC-SCNC: 4 MMOL/L (ref 0–18)
AST SERPL-CCNC: 27 U/L (ref 15–37)
BASOPHILS # BLD AUTO: 0.01 X10(3) UL (ref 0–0.2)
BASOPHILS NFR BLD AUTO: 0.1 %
BILIRUB SERPL-MCNC: 0.3 MG/DL (ref 0.1–2)
BUN BLD-MCNC: 16 MG/DL (ref 7–18)
BUN/CREAT SERPL: 15.4 (ref 10–20)
CALCIUM BLD-MCNC: 9.2 MG/DL (ref 8.5–10.1)
CHLORIDE SERPL-SCNC: 106 MMOL/L (ref 98–112)
CO2 SERPL-SCNC: 27 MMOL/L (ref 21–32)
CREAT BLD-MCNC: 1.04 MG/DL (ref 0.7–1.3)
DEPRECATED RDW RBC AUTO: 47.8 FL (ref 35.1–46.3)
EOSINOPHIL # BLD AUTO: 0.14 X10(3) UL (ref 0–0.7)
EOSINOPHIL NFR BLD AUTO: 1.3 %
ERYTHROCYTE [DISTWIDTH] IN BLOOD BY AUTOMATED COUNT: 13.6 % (ref 11–15)
GLOBULIN PLAS-MCNC: 4.2 G/DL (ref 2.8–4.4)
GLUCOSE BLD-MCNC: 100 MG/DL (ref 70–99)
GLUCOSE BLD-MCNC: 108 MG/DL (ref 70–99)
GLUCOSE BLD-MCNC: 112 MG/DL (ref 70–99)
GLUCOSE BLD-MCNC: 136 MG/DL (ref 70–99)
HCT VFR BLD AUTO: 45.4 % (ref 39–53)
HGB BLD-MCNC: 15.5 G/DL (ref 13–17.5)
IMM GRANULOCYTES # BLD AUTO: 0.04 X10(3) UL (ref 0–1)
IMM GRANULOCYTES NFR BLD: 0.4 %
LYMPHOCYTES # BLD AUTO: 1.4 X10(3) UL (ref 1–4)
LYMPHOCYTES NFR BLD AUTO: 12.9 %
M PROTEIN MFR SERPL ELPH: 7.9 G/DL (ref 6.4–8.2)
MCH RBC QN AUTO: 33.2 PG (ref 26–34)
MCHC RBC AUTO-ENTMCNC: 34.1 G/DL (ref 31–37)
MCV RBC AUTO: 97.2 FL (ref 80–100)
MONOCYTES # BLD AUTO: 0.79 X10(3) UL (ref 0.1–1)
MONOCYTES NFR BLD AUTO: 7.3 %
NEUTROPHILS # BLD AUTO: 8.49 X10 (3) UL (ref 1.5–7.7)
NEUTROPHILS # BLD AUTO: 8.49 X10(3) UL (ref 1.5–7.7)
NEUTROPHILS NFR BLD AUTO: 78 %
OSMOLALITY SERPL CALC.SUM OF ELEC: 286 MOSM/KG (ref 275–295)
PLATELET # BLD AUTO: 168 10(3)UL (ref 150–450)
POTASSIUM SERPL-SCNC: 4 MMOL/L (ref 3.5–5.1)
RBC # BLD AUTO: 4.67 X10(6)UL (ref 4.3–5.7)
SODIUM SERPL-SCNC: 137 MMOL/L (ref 136–145)
WBC # BLD AUTO: 10.9 X10(3) UL (ref 4–11)

## 2020-03-18 PROCEDURE — 82962 GLUCOSE BLOOD TEST: CPT

## 2020-03-18 PROCEDURE — 99284 EMERGENCY DEPT VISIT MOD MDM: CPT

## 2020-03-18 PROCEDURE — 36415 COLL VENOUS BLD VENIPUNCTURE: CPT

## 2020-03-18 PROCEDURE — 80053 COMPREHEN METABOLIC PANEL: CPT | Performed by: EMERGENCY MEDICINE

## 2020-03-18 PROCEDURE — 96372 THER/PROPH/DIAG INJ SC/IM: CPT

## 2020-03-18 PROCEDURE — 85025 COMPLETE CBC W/AUTO DIFF WBC: CPT | Performed by: EMERGENCY MEDICINE

## 2020-03-19 NOTE — ED NOTES
MOM BEDSIDE, PER MOTHER PT STARTED CRYING ABOUT SOMETHING ON TV, BECAME CONCERNED ABOUT HIS BS DUE TO ABNORMAL BEHAVIOR. MOTHER CALLED PCP AND WAS TOLD TO COME TO ED. PT DOES NOT REMEMBER INCIDENT. PT A&OX3 NOW. PT GIVEN APPROX.  240ML OF 10% DEXTROSE

## 2020-03-19 NOTE — CM/SW NOTE
Patient has Medtronic Mini Med [id number] insulin pump  SN: [id number]  Model No: [id number]  Version 4. 11E     Patients insulin pump is on the recall list due to cartridge malfunction and possible overdose/underdose of insulin.  Pt is aware that his pump is on the

## 2020-03-19 NOTE — ED INITIAL ASSESSMENT (HPI)
A/O x 4. Patient presents with hypoglycemia, insulin pump disconnected at this time. Per EMS, accucheck on their arrival was upper 30s. Patient was altered and unable to follow commands.   Patient is a previous dialysis patient, has not had dialysis since

## 2020-03-19 NOTE — ED PROVIDER NOTES
Patient Seen in: [facility] Emergency Department      History   Patient presents with:  Hypoglycemia    Stated Complaint: hypoglycemia    HPI    Patient comes in after hypoglycemic episode at home.   He has an insulin pump that was last checked appro Unspecified essential hypertension 2/6/2013   • Unspecified vitamin D deficiency 2/6/2013   • Visual impairment     scarring on left eye              No pertinent past surgical history.                   Social History    Tobacco Use      Smoking status: Ne Glucose 112 (*)     All other components within normal limits   POCT GLUCOSE - Abnormal; Notable for the following components:    POC Glucose 100 (*)     All other components within normal limits   POCT GLUCOSE - Abnormal; Notable for the following compone patient. He feels comfortable doing this and says he remembers dosing like this before the insulin pump was in place. He will [facility]. He says he has a phone number for [facility].               Disposition

## 2020-10-29 ENCOUNTER — HOSPITAL ENCOUNTER (EMERGENCY)
Facility: HOSPITAL | Age: 55
Discharge: HOME | End: 2020-10-29
Attending: EMERGENCY MEDICINE
Payer: MEDICARE

## 2020-10-29 VITALS
SYSTOLIC BLOOD PRESSURE: 165 MMHG | HEART RATE: 70 BPM | DIASTOLIC BLOOD PRESSURE: 69 MMHG | TEMPERATURE: 98 F | OXYGEN SATURATION: 98 % | BODY MASS INDEX: 27.85 KG/M2 | WEIGHT: 188 LBS | RESPIRATION RATE: 18 BRPM | HEIGHT: 68.9 IN

## 2020-10-29 DIAGNOSIS — Z20.822 PERSON UNDER INVESTIGATION FOR COVID-19: Primary | ICD-10-CM

## 2020-10-29 DIAGNOSIS — R11.0 NAUSEA: ICD-10-CM

## 2020-10-29 PROCEDURE — 96375 TX/PRO/DX INJ NEW DRUG ADDON: CPT

## 2020-10-29 PROCEDURE — 96374 THER/PROPH/DIAG INJ IV PUSH: CPT

## 2020-10-29 PROCEDURE — 99284 EMERGENCY DEPT VISIT MOD MDM: CPT

## 2020-10-29 PROCEDURE — 81003 URINALYSIS AUTO W/O SCOPE: CPT | Performed by: EMERGENCY MEDICINE

## 2020-10-29 PROCEDURE — 96361 HYDRATE IV INFUSION ADD-ON: CPT

## 2020-10-29 PROCEDURE — 80053 COMPREHEN METABOLIC PANEL: CPT | Performed by: EMERGENCY MEDICINE

## 2020-10-29 PROCEDURE — 85025 COMPLETE CBC W/AUTO DIFF WBC: CPT | Performed by: EMERGENCY MEDICINE

## 2020-10-29 RX ORDER — ONDANSETRON 4 MG/1
4 TABLET, ORALLY DISINTEGRATING ORAL EVERY 4 HOURS PRN
Qty: 10 TABLET | Refills: 0 | Status: SHIPPED | OUTPATIENT
Start: 2020-10-29 | End: 2020-11-05

## 2020-10-29 RX ORDER — KETOROLAC TROMETHAMINE 30 MG/ML
15 INJECTION, SOLUTION INTRAMUSCULAR; INTRAVENOUS ONCE
Status: COMPLETED | OUTPATIENT
Start: 2020-10-29 | End: 2020-10-29

## 2020-10-29 RX ORDER — ONDANSETRON 2 MG/ML
4 INJECTION INTRAMUSCULAR; INTRAVENOUS ONCE
Status: COMPLETED | OUTPATIENT
Start: 2020-10-29 | End: 2020-10-29

## 2020-10-29 NOTE — ED INITIAL ASSESSMENT (HPI)
Pt to ED with complaints of continued nausea and not feeling well since Tuesday. Pt went to urgent care yesterday, given IV zofran and taking zofran at home. Pt reports only mild relief of nausea.

## 2020-10-29 NOTE — ED PROVIDER NOTES
Patient Seen in: [facility] Emergency Department      History   Patient presents with:  Nausea    Stated Complaint: nausea    HPI    Patient is a 59-year-old gentleman with multiple medical complaints including renal transplant no longer on dialysis OPEN     • ESOPHAGOGASTRODUODENOSCOPY (EGD) N/A 10/27/2015    Performed by Antonella Jovel MD at [address]      right kidney   • OTHER      tooth extraction   • OTHER      right sided dialysis - right arm fistula   • Course     Labs Reviewed   COMP METABOLIC PANEL (14) - Abnormal; Notable for the following components:       Result Value    Glucose 109 (*)     Sodium 135 (*)     All other components within normal limits   CBC W/ DIFFERENTIAL - Abnormal; Notable for the duplicate medications found. Please discuss with provider.

## 2021-02-01 ENCOUNTER — APPOINTMENT (OUTPATIENT)
Dept: GENERAL RADIOLOGY | Facility: HOSPITAL | Age: 56
End: 2021-02-01
Attending: EMERGENCY MEDICINE
Payer: MEDICARE

## 2021-02-01 ENCOUNTER — HOSPITAL ENCOUNTER (EMERGENCY)
Facility: HOSPITAL | Age: 56
Discharge: HOME | End: 2021-02-01
Attending: EMERGENCY MEDICINE
Payer: MEDICARE

## 2021-02-01 VITALS
OXYGEN SATURATION: 99 % | DIASTOLIC BLOOD PRESSURE: 77 MMHG | TEMPERATURE: 99 F | HEIGHT: 68 IN | HEART RATE: 79 BPM | WEIGHT: 185 LBS | BODY MASS INDEX: 28.04 KG/M2 | SYSTOLIC BLOOD PRESSURE: 152 MMHG | RESPIRATION RATE: 16 BRPM

## 2021-02-01 DIAGNOSIS — L97.511 ULCER OF RIGHT FOOT, LIMITED TO BREAKDOWN OF SKIN (HCC): Primary | ICD-10-CM

## 2021-02-01 LAB
ALBUMIN SERPL-MCNC: 3.6 G/DL (ref 3.4–5)
ALBUMIN/GLOB SERPL: 0.8 {RATIO} (ref 1–2)
ALP LIVER SERPL-CCNC: 174 U/L
ALT SERPL-CCNC: 33 U/L
ANION GAP SERPL CALC-SCNC: 4 MMOL/L (ref 0–18)
AST SERPL-CCNC: 32 U/L (ref 15–37)
BASOPHILS # BLD AUTO: 0.02 X10(3) UL (ref 0–0.2)
BASOPHILS NFR BLD AUTO: 0.3 %
BILIRUB SERPL-MCNC: 0.5 MG/DL (ref 0.1–2)
BUN BLD-MCNC: 13 MG/DL (ref 7–18)
BUN/CREAT SERPL: 11.5 (ref 10–20)
CALCIUM BLD-MCNC: 9.5 MG/DL (ref 8.5–10.1)
CHLORIDE SERPL-SCNC: 103 MMOL/L (ref 98–112)
CO2 SERPL-SCNC: 26 MMOL/L (ref 21–32)
CREAT BLD-MCNC: 1.13 MG/DL
DEPRECATED RDW RBC AUTO: 50.9 FL (ref 35.1–46.3)
EOSINOPHIL # BLD AUTO: 0.28 X10(3) UL (ref 0–0.7)
EOSINOPHIL NFR BLD AUTO: 3.6 %
ERYTHROCYTE [DISTWIDTH] IN BLOOD BY AUTOMATED COUNT: 14.2 % (ref 11–15)
GLOBULIN PLAS-MCNC: 4.8 G/DL (ref 2.8–4.4)
GLUCOSE BLD-MCNC: 269 MG/DL (ref 70–99)
HCT VFR BLD AUTO: 43.9 %
HGB BLD-MCNC: 14.1 G/DL
IMM GRANULOCYTES # BLD AUTO: 0.03 X10(3) UL (ref 0–1)
IMM GRANULOCYTES NFR BLD: 0.4 %
LYMPHOCYTES # BLD AUTO: 2.51 X10(3) UL (ref 1–4)
LYMPHOCYTES NFR BLD AUTO: 32.1 %
M PROTEIN MFR SERPL ELPH: 8.4 G/DL (ref 6.4–8.2)
MCH RBC QN AUTO: 31.5 PG (ref 26–34)
MCHC RBC AUTO-ENTMCNC: 32.1 G/DL (ref 31–37)
MCV RBC AUTO: 98 FL
MONOCYTES # BLD AUTO: 0.62 X10(3) UL (ref 0.1–1)
MONOCYTES NFR BLD AUTO: 7.9 %
NEUTROPHILS # BLD AUTO: 4.37 X10 (3) UL (ref 1.5–7.7)
NEUTROPHILS # BLD AUTO: 4.37 X10(3) UL (ref 1.5–7.7)
NEUTROPHILS NFR BLD AUTO: 55.7 %
OSMOLALITY SERPL CALC.SUM OF ELEC: 286 MOSM/KG (ref 275–295)
PLATELET # BLD AUTO: 212 10(3)UL (ref 150–450)
POTASSIUM SERPL-SCNC: 4.1 MMOL/L (ref 3.5–5.1)
RBC # BLD AUTO: 4.48 X10(6)UL
SODIUM SERPL-SCNC: 133 MMOL/L (ref 136–145)
WBC # BLD AUTO: 7.8 X10(3) UL (ref 4–11)

## 2021-02-01 PROCEDURE — 73630 X-RAY EXAM OF FOOT: CPT | Performed by: EMERGENCY MEDICINE

## 2021-02-01 PROCEDURE — 96365 THER/PROPH/DIAG IV INF INIT: CPT

## 2021-02-01 PROCEDURE — 99284 EMERGENCY DEPT VISIT MOD MDM: CPT

## 2021-02-01 PROCEDURE — 80053 COMPREHEN METABOLIC PANEL: CPT | Performed by: EMERGENCY MEDICINE

## 2021-02-01 PROCEDURE — 85025 COMPLETE CBC W/AUTO DIFF WBC: CPT | Performed by: EMERGENCY MEDICINE

## 2021-02-01 RX ORDER — CEPHALEXIN 500 MG/1
500 CAPSULE ORAL 4 TIMES DAILY
Qty: 40 CAPSULE | Refills: 0 | Status: SHIPPED | OUTPATIENT
Start: 2021-02-01 | End: 2021-02-11

## 2021-02-01 NOTE — ED PROVIDER NOTES
Patient Seen in: [facility] Emergency Department      History   Patient presents with:  Cellulitis    Stated Complaint: wound to bottom of foot, cast removed on Friday, hx DM    HPI/Subjective:   HPI    31-year-old male presents emergency department General appearance: Patient is alert and in no acute distress  HEENT: Pupils equal react to light extraocular muscles intact no scleral icterus, mucous membranes are moist, there is no erythema or exudate in the posterior pharynx  Neck: Supple no JVD no ly Wounds are nontender not draining. However due to the fact that he is diabetic I do feel the cast probably was causing a pressure ulcer.   We will give him a gram of Ancef however I just feel he could probably follow-up with wound care and will give him I personally reviewed the images myself and went over the results with the patient. Patient tolerated antibiotics well. We will give him a postop shoe and have him follow-up with wound care. Do not feel he needs to be admitted.     MDM      Patient w

## 2021-03-22 ENCOUNTER — APPOINTMENT (OUTPATIENT)
Dept: GENERAL RADIOLOGY | Facility: HOSPITAL | Age: 56
DRG: 253 | End: 2021-03-22
Attending: EMERGENCY MEDICINE
Payer: MEDICARE

## 2021-03-22 ENCOUNTER — APPOINTMENT (OUTPATIENT)
Dept: ULTRASOUND IMAGING | Facility: HOSPITAL | Age: 56
DRG: 253 | End: 2021-03-22
Attending: EMERGENCY MEDICINE
Payer: MEDICARE

## 2021-03-22 ENCOUNTER — HOSPITAL ENCOUNTER (INPATIENT)
Facility: HOSPITAL | Age: 56
LOS: 9 days | Discharge: ANOTHER HOSPITAL | DRG: 253 | End: 2021-04-03
Attending: HOSPITALIST | Admitting: HOSPITALIST
Payer: MEDICARE

## 2021-03-22 DIAGNOSIS — L03.115 CELLULITIS OF RIGHT LOWER EXTREMITY: Primary | ICD-10-CM

## 2021-03-22 PROCEDURE — 93971 EXTREMITY STUDY: CPT | Performed by: EMERGENCY MEDICINE

## 2021-03-22 PROCEDURE — 73590 X-RAY EXAM OF LOWER LEG: CPT | Performed by: EMERGENCY MEDICINE

## 2021-03-22 PROCEDURE — 99223 1ST HOSP IP/OBS HIGH 75: CPT | Performed by: HOSPITALIST

## 2021-03-22 PROCEDURE — 73630 X-RAY EXAM OF FOOT: CPT | Performed by: EMERGENCY MEDICINE

## 2021-03-22 PROCEDURE — 73552 X-RAY EXAM OF FEMUR 2/>: CPT | Performed by: EMERGENCY MEDICINE

## 2021-03-22 RX ORDER — TACROLIMUS 1 MG/1
3 CAPSULE ORAL DAILY
Status: DISCONTINUED | OUTPATIENT
Start: 2021-03-23 | End: 2021-04-03

## 2021-03-22 RX ORDER — VENLAFAXINE 37.5 MG/1
37.5 TABLET ORAL NIGHTLY
Status: DISCONTINUED | OUTPATIENT
Start: 2021-03-22 | End: 2021-04-03

## 2021-03-22 RX ORDER — ASPIRIN 325 MG
325 TABLET, DELAYED RELEASE (ENTERIC COATED) ORAL DAILY
Status: DISCONTINUED | OUTPATIENT
Start: 2021-03-22 | End: 2021-03-22

## 2021-03-22 RX ORDER — PREDNISONE 1 MG/1
5 TABLET ORAL DAILY
Status: DISCONTINUED | OUTPATIENT
Start: 2021-03-22 | End: 2021-03-23

## 2021-03-22 RX ORDER — MORPHINE SULFATE 4 MG/ML
4 INJECTION, SOLUTION INTRAMUSCULAR; INTRAVENOUS EVERY 2 HOUR PRN
Status: DISCONTINUED | OUTPATIENT
Start: 2021-03-22 | End: 2021-04-03

## 2021-03-22 RX ORDER — PRAVASTATIN SODIUM 10 MG
10 TABLET ORAL NIGHTLY
Status: DISCONTINUED | OUTPATIENT
Start: 2021-03-22 | End: 2021-03-23

## 2021-03-22 RX ORDER — DAPSONE 100 MG/1
100 TABLET ORAL DAILY
Status: DISCONTINUED | OUTPATIENT
Start: 2021-03-22 | End: 2021-03-22

## 2021-03-22 RX ORDER — LEVETIRACETAM 250 MG/1
250 TABLET ORAL 2 TIMES DAILY
Status: DISCONTINUED | OUTPATIENT
Start: 2021-03-22 | End: 2021-03-23

## 2021-03-22 RX ORDER — GLIMEPIRIDE 2 MG/1
1 TABLET ORAL 2 TIMES DAILY
Status: DISCONTINUED | OUTPATIENT
Start: 2021-03-22 | End: 2021-04-03

## 2021-03-22 RX ORDER — MYCOPHENOLIC ACID 360 MG/1
360 TABLET, DELAYED RELEASE ORAL EVERY 12 HOURS
Status: DISCONTINUED | OUTPATIENT
Start: 2021-03-22 | End: 2021-04-03

## 2021-03-22 RX ORDER — ZOLPIDEM TARTRATE 5 MG/1
5 TABLET ORAL NIGHTLY PRN
Status: DISCONTINUED | OUTPATIENT
Start: 2021-03-22 | End: 2021-03-23

## 2021-03-22 RX ORDER — POLYETHYLENE GLYCOL 3350 17 G/17G
17 POWDER, FOR SOLUTION ORAL DAILY
Status: DISCONTINUED | OUTPATIENT
Start: 2021-03-22 | End: 2021-04-03

## 2021-03-22 RX ORDER — LEVOFLOXACIN 5 MG/ML
750 INJECTION, SOLUTION INTRAVENOUS ONCE
Status: COMPLETED | OUTPATIENT
Start: 2021-03-22 | End: 2021-03-22

## 2021-03-22 RX ORDER — DEXTROSE MONOHYDRATE 25 G/50ML
50 INJECTION, SOLUTION INTRAVENOUS
Status: DISCONTINUED | OUTPATIENT
Start: 2021-03-22 | End: 2021-04-03

## 2021-03-22 RX ORDER — HYDROCODONE BITARTRATE AND ACETAMINOPHEN 5; 325 MG/1; MG/1
1 TABLET ORAL EVERY 6 HOURS PRN
Status: DISCONTINUED | OUTPATIENT
Start: 2021-03-22 | End: 2021-04-03

## 2021-03-22 RX ORDER — HYDROMORPHONE HYDROCHLORIDE 1 MG/ML
INJECTION, SOLUTION INTRAMUSCULAR; INTRAVENOUS; SUBCUTANEOUS EVERY 30 MIN PRN
Status: DISCONTINUED | OUTPATIENT
Start: 2021-03-22 | End: 2021-04-03

## 2021-03-22 RX ORDER — MORPHINE SULFATE 2 MG/ML
2 INJECTION, SOLUTION INTRAMUSCULAR; INTRAVENOUS EVERY 2 HOUR PRN
Status: DISCONTINUED | OUTPATIENT
Start: 2021-03-22 | End: 2021-04-03

## 2021-03-22 RX ORDER — METOPROLOL SUCCINATE 50 MG/1
50 TABLET, EXTENDED RELEASE ORAL
Status: DISCONTINUED | OUTPATIENT
Start: 2021-03-22 | End: 2021-04-03

## 2021-03-22 RX ORDER — TACROLIMUS 0.5 MG/1
2 CAPSULE ORAL EVERY EVENING
Status: DISCONTINUED | OUTPATIENT
Start: 2021-03-22 | End: 2021-04-03

## 2021-03-22 RX ORDER — DOCUSATE SODIUM 100 MG/1
100 CAPSULE, LIQUID FILLED ORAL NIGHTLY
Status: DISCONTINUED | OUTPATIENT
Start: 2021-03-22 | End: 2021-04-03

## 2021-03-22 RX ORDER — MORPHINE SULFATE 2 MG/ML
1 INJECTION, SOLUTION INTRAMUSCULAR; INTRAVENOUS EVERY 2 HOUR PRN
Status: DISCONTINUED | OUTPATIENT
Start: 2021-03-22 | End: 2021-04-03

## 2021-03-22 NOTE — H&P
[facility] HOSPITALIST  History and Physical     Hoang Gil Patient Status:  Emergency    1965 MRN [id number]   Location [facility] Attending [place], [facility] Day # 0 [facility]     Chief Complaint: Mathew Doan last one 2 yrs ago   • Seizure disorder ([facility]    • Seizures ([facility] 2/6/2013   • Type II or unspecified type diabetes mellitus without mention of complication, not stated as uncontrolled 2/6/2013   • Unspecified essential hypertension 2/6/2013   • Unspecifie Pen-injector, Inject 1 Units into the skin 3 (three) times daily before meals.  1 unit per 10g carbs plus 1 unit for every 20 points above 140, Disp: 1 pen, Rfl: 0  insulin glargine (LANTUS SOLOSTAR) 100 UNIT/ML Subcutaneous Solution Pen-injector, Inject 12 Diagnosis: E11.9, Disp: 100 each, Rfl: 5  dapsone 100 MG Oral Tab, daily. , Disp: , Rfl:   Awzehqqkvo-Nzagixsf-Mmgrwdj AF (ODEFSEY) 200-25-25 MG Oral Tab, Take by mouth nightly.  , Disp: , Rfl:   TACROLIMUS OR, Take 1 mg by mouth.  3 mg in am and 2 mg in 1 WBC 13.5*   HGB 13.8   MCV 93.5   .0       Recent Labs   Lab 03/22/21  1602   *   BUN 15   CREATSERUM 0.97   GFRAA 101   GFRNAA 88   CA 9.6   ALB 3.4   *   K 4.0      CO2 26.0   ALKPHO 120*   AST 17   ALT 18   BILT 0.8   TP 7.4
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
## 2021-03-22 NOTE — ED NOTES
Able to obtain pedal pulse on left with doppler. Unable to obtain dorsalis pedis to right foot, but able to obtain posterior tibial.    [employer] to come attempt placement of IV via US.
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
## 2021-03-22 NOTE — ED INITIAL ASSESSMENT (HPI)
Patient is failing outpatient treatment for wounds on right foot. Pain is now moving up leg. Denies fever.

## 2021-03-22 NOTE — ED NOTES
Attempt made to place IV without success. Pt with very limited IV access. Charge contacted for US IV placement.

## 2021-03-22 NOTE — ED NOTES
Pt would like to decline dilaudid for pain at this time. Reports pain 6/10. Pt gives permission for us to speak with his family regarding updates and as to why he is here today.

## 2021-03-22 NOTE — PROGRESS NOTES
[facility] Pharmacy Note: Antimicrobial Weight Based Dose Adjustment for: levofloxacin [facility])    Brennen Tran is a 54year old patient who has been prescribed levofloxacin (LEVAQUIN) 500 mg once.   CrCl cannot be calculated (Patient's most recent lab result

## 2021-03-22 NOTE — ED NOTES
Pt awake and alert, skin w/d,resps reg/unlabored. Pt appears comfortable, does not wish to have any pain medication at this time.

## 2021-03-23 ENCOUNTER — TELEPHONE (OUTPATIENT)
Dept: PODIATRY CLINIC | Facility: CLINIC | Age: 56
End: 2021-03-23

## 2021-03-23 PROBLEM — E11.9 DIABETES MELLITUS (HCC): Status: ACTIVE | Noted: 2017-11-23

## 2021-03-23 PROCEDURE — 99221 1ST HOSP IP/OBS SF/LOW 40: CPT | Performed by: PODIATRIST

## 2021-03-23 PROCEDURE — 99232 SBSQ HOSP IP/OBS MODERATE 35: CPT | Performed by: HOSPITALIST

## 2021-03-23 RX ORDER — VENLAFAXINE HYDROCHLORIDE 37.5 MG/1
37.5 CAPSULE, EXTENDED RELEASE ORAL NIGHTLY
COMMUNITY

## 2021-03-23 RX ORDER — METOPROLOL TARTRATE 50 MG/1
50 TABLET, FILM COATED ORAL 2 TIMES DAILY
COMMUNITY

## 2021-03-23 RX ORDER — ACETAMINOPHEN 500 MG
500 TABLET ORAL EVERY 6 HOURS PRN
COMMUNITY

## 2021-03-23 RX ORDER — TACROLIMUS 1 MG/1
2 CAPSULE ORAL EVERY EVENING
COMMUNITY

## 2021-03-23 RX ORDER — PRAVASTATIN SODIUM 10 MG
10 TABLET ORAL DAILY
Status: DISCONTINUED | OUTPATIENT
Start: 2021-03-23 | End: 2021-04-03

## 2021-03-23 RX ORDER — ACYCLOVIR 200 MG/1
200 CAPSULE ORAL 2 TIMES DAILY
COMMUNITY

## 2021-03-23 RX ORDER — LEVETIRACETAM 500 MG/1
500 TABLET ORAL DAILY
Status: DISCONTINUED | OUTPATIENT
Start: 2021-03-23 | End: 2021-04-03

## 2021-03-23 RX ORDER — OMEPRAZOLE 20 MG/1
20 CAPSULE, DELAYED RELEASE ORAL 2 TIMES DAILY
COMMUNITY

## 2021-03-23 RX ORDER — ALENDRONATE SODIUM 70 MG/1
70 TABLET ORAL WEEKLY
Status: ON HOLD | COMMUNITY
End: 2021-03-23

## 2021-03-23 RX ORDER — TACROLIMUS 1 MG/1
3 CAPSULE ORAL EVERY MORNING
COMMUNITY

## 2021-03-23 RX ORDER — PRAVASTATIN SODIUM 80 MG/1
40 TABLET ORAL NIGHTLY
COMMUNITY

## 2021-03-23 RX ORDER — MAG HYDROX/ALUMINUM HYD/SIMETH 400-400-40
5000 SUSPENSION, ORAL (FINAL DOSE FORM) ORAL DAILY
COMMUNITY

## 2021-03-23 RX ORDER — ZOLPIDEM TARTRATE 5 MG/1
5 TABLET ORAL NIGHTLY
Status: DISCONTINUED | OUTPATIENT
Start: 2021-03-23 | End: 2021-04-03

## 2021-03-23 RX ORDER — ZOLPIDEM TARTRATE 5 MG/1
5 TABLET ORAL NIGHTLY PRN
Status: DISCONTINUED | OUTPATIENT
Start: 2021-03-23 | End: 2021-04-03

## 2021-03-23 RX ORDER — PREDNISONE 1 MG/1
5 TABLET ORAL DAILY
Status: DISCONTINUED | OUTPATIENT
Start: 2021-03-23 | End: 2021-04-03

## 2021-03-23 RX ORDER — ACYCLOVIR 200 MG/1
200 CAPSULE ORAL 2 TIMES DAILY
Status: DISCONTINUED | OUTPATIENT
Start: 2021-03-23 | End: 2021-04-03

## 2021-03-23 NOTE — PROGRESS NOTES
[facility]ITAL[facility]  Progress Note     Bernell Filter Patient Status:  Observation    1965 MRN [id number]   [facility] 3NW-A Attending Kedar Felipe MD   Hosp Day # 0 PCP [facility]     Chief Complaint: leg pain    S: Patient feel 3/24/2021] insulin detemir  20 Units Subcutaneous Daily   • zolpidem  5 mg Oral Nightly   • apixaban  2.5 mg Oral BID   • docusate sodium  100 mg Oral Nightly   • metoprolol succinate  50 mg Oral 2x Daily(Beta Blocker)   • mycophenolate sodium  360 mg Oral

## 2021-03-23 NOTE — PLAN OF CARE
NURSING ADMISSION NOTE      Patient admitted via Cart  Oriented to room. Safety precautions initiated. Bed in low position. Call light in reach. Pt arrived onto floor in stable condition from ER. VSS   A/ox4 upon assessment.  C/o generalized pain Administer analgesics based on type and severity of pain and evaluate response  - Implement non-pharmacological measures as appropriate and evaluate response  - Consider cultural and social influences on pain and pain management  - Manage/alleviate anxiety

## 2021-03-23 NOTE — CONSULTS
[facility]  Report of Consultation    Brennen Tran Patient Status:  Observation    1965 MRN [id number]   [facility] 3NW-A Attending Paula Jay MD   Hosp Day # 0  [facility]     Date of Admission:  3/22/2021  Date of 2/6/2013   • Visual impairment     scarring on left eye     Past Surgical History:   Procedure Laterality Date   • ANESTH,KIDNEY TRANSPLANT     • APPENDECTOMY     • AV FISTULA REVISION, OPEN     • ESOPHAGOGASTRODUODENOSCOPY (EGD) N/A 10/27/2015    Performe Topical, Daily  •  HYDROmorphone HCl (DILAUDID) 1 MG/ML injection 0.5-1 mg, 0.5-1 mg, Intravenous, Q30 Min PRN  •  docusate sodium (COLACE) cap 100 mg, 100 mg, Oral, Nightly  •  HYDROcodone-acetaminophen (NORCO) 5-325 MG per tab 1 tablet, 1 tablet, Oral, Q to palpation and he has a dry eschar on the posterior aspect of the heel again about a centimeter and a half in diameter. There is no surrounding inflammation or redness or sign of infection at this time. General: Alert, orientated x3. Cooperative.   Debara Bosworth suggest osteomyelitis.                   =====  CONCLUSION:    1. Irregularity of the proximal phalanx of the 5th digit which may represent a healing fracture. 2. Intramedullary angela and screw fixation of a distal tibial fracture.   3. Healing distal fibul nurse    Juliana Alonzo, TJM

## 2021-03-23 NOTE — PLAN OF CARE
A&Ox4. Reports mild pain, declines need for medication at this time. Tolerating diet. Wound to bottom of right foot and heel, red and necrotic tissue and painful. White patches on bilateral feet. IVF infusing. Reviewed plan of care.     1130 White patches n opioid side effects  - Notify MD/LIP if interventions unsuccessful or patient reports new pain  - Anticipate increased pain with activity and pre-medicate as appropriate  Outcome: Progressing     Problem: RISK FOR INFECTION - ADULT  Goal: Absence of fever/

## 2021-03-23 NOTE — CONSULTS
INFECTIOUS DISEASE CONSULTATION    Radha Winslow Patient Status:  Observation    1965 MRN [id number]   [facility] 3NW-A Attending Josef Francis MD   Hosp Day # 0 PCP [facility] Procedure Laterality Date   • ANESTH,KIDNEY TRANSPLANT     • APPENDECTOMY     • AV FISTULA REVISION, OPEN     • ESOPHAGOGASTRODUODENOSCOPY (EGD) N/A 10/27/2015    Performed by Vu Colón MD at [address] [place] [address]      ri HYDROcodone-acetaminophen (NORCO) 5-325 MG per tab 1 tablet, 1 tablet, Oral, Q6H PRN  •  Polyethyl Glycol-Propyl Glycol (SYSTANE) 0.4-0.3 % ophthalmic solution 1 drop, 1 drop, Both Eyes, QID PRN  •  Metoprolol Succinate ER (Toprol XL) 24 hr tab 50 mg, 50 m Disp: , Rfl:   metoprolol succinate 12.5 mg Oral Tab, Take 50 mg by mouth 2x Daily(Beta Blocker). , Disp: , Rfl:   Pravastatin Sodium 10 MG Oral Tab, Take 10 mg by mouth nightly.  Unknown dose, Disp: , Rfl:   PEG 3350 Oral Powd Pack, Take 17 g by mouth juan by mouth 2 (two) times daily. , Disp: , Rfl:   Timolol Maleate 0.25 % Ophthalmic Solution, Place 1 drop into both eyes 2 (two) times daily. , Disp: , Rfl:   NON FORMULARY,  , Disp: , Rfl:   HYDROcodone-acetaminophen 5-325 MG Oral Tab, Take 1 tablet by mouth CA 9.6   ALB 3.4   *   K 4.0      CO2 26.0   ALKPHO 120*   AST 17   ALT 18   BILT 0.8   TP 7.4         Lab Results   Component Value Date    PGLU 233 03/23/2021         Established Problem list:  Patient Active Problem List:     Central ponti

## 2021-03-24 ENCOUNTER — APPOINTMENT (OUTPATIENT)
Dept: ULTRASOUND IMAGING | Facility: HOSPITAL | Age: 56
DRG: 253 | End: 2021-03-24
Attending: PODIATRIST
Payer: MEDICARE

## 2021-03-24 PROCEDURE — 93926 LOWER EXTREMITY STUDY: CPT | Performed by: PODIATRIST

## 2021-03-24 PROCEDURE — 99232 SBSQ HOSP IP/OBS MODERATE 35: CPT | Performed by: HOSPITALIST

## 2021-03-24 NOTE — CONSULTS
[facility]  Inpatient Wound Care Contact Note    Alda Chan Patient Status:  Observation    1965 MRN [id number]   [facility] 3NW-A Attending Sarwat Marks MD   Hosp Day # 0 PCP [facility]     Attempted to see patient for fo

## 2021-03-24 NOTE — PLAN OF CARE
A&OX4. Reports mild pain at this time, decline medication. Denies nausea. 2 ulcers to right foot. Black/brown tissue. Santyl applied and wet to dry dressing. Scant amount of brown drainage on old dressing. IVF infusing. Reviewed plan of care.      [facility] techniques  - Monitor for opioid side effects  - Notify MD/LIP if interventions unsuccessful or patient reports new pain  - Anticipate increased pain with activity and pre-medicate as appropriate  Outcome: Progressing     Problem: RISK FOR INFECTION - ADUL

## 2021-03-24 NOTE — PROGRESS NOTES
[facility]                INFECTIOUS DISEASE PROGRESS NOTE    Alda Chan Patient Status:  Observation    1965 MRN [id number]   [facility] 3NW-A Attending Sarwat Marks MD   Hosp Day # 0 PCP [facility]     Antibiotics:m Specimen: Blood,peripheral   Result Value Ref Range    Blood Culture Result No Growth 1 Day N/A         Radiology    US pending    Problem list reviewed:  Patient Active Problem List:     Central pontine myelinolysis ([facility] [address]     HIV (human immunodeficiency v

## 2021-03-24 NOTE — PLAN OF CARE
Pt a/ox4. Irritable upon assessment, room air, NSR on tele. Voiding freely. Denies n/v tolerating diet. Accu QID. Surgical boot R foot BRI wound, dressing intact. Brace to R forearm . IV abx per mar. Up w/cane.  R arm precautions and seizure precautions miguel patient reports new pain  - Anticipate increased pain with activity and pre-medicate as appropriate  Outcome: Progressing     Problem: RISK FOR INFECTION - ADULT  Goal: Absence of fever/infection during anticipated neutropenic period  Description: Rhiannon Gama

## 2021-03-24 NOTE — PROGRESS NOTES
[facility]  Progress Note     Percy Brendan Patient Status:  Observation    1965 MRN [id number]   [facility] 3NW-A Attending Oswald Mejia MD   Hosp Day # 0 PCP [facility]     Chief Complaint: Nonhealing right foot wounds Subcutaneous Daily   • apixaban  5 mg Oral BID   • acyclovir  200 mg Oral BID   • collagenase   Topical Daily   • docusate sodium  100 mg Oral Nightly   • metoprolol succinate  50 mg Oral 2x Daily(Beta Blocker)   • mycophenolate sodium  360 mg Oral Q12H

## 2021-03-25 PROCEDURE — 99233 SBSQ HOSP IP/OBS HIGH 50: CPT | Performed by: HOSPITALIST

## 2021-03-25 PROCEDURE — 99232 SBSQ HOSP IP/OBS MODERATE 35: CPT | Performed by: PODIATRIST

## 2021-03-25 RX ORDER — DEXTROSE MONOHYDRATE 25 G/50ML
50 INJECTION, SOLUTION INTRAVENOUS
Status: DISCONTINUED | OUTPATIENT
Start: 2021-03-25 | End: 2021-04-03

## 2021-03-25 NOTE — PROGRESS NOTES
Md on unit. Writing RN notified Md regarding pt's elevated BP and redness/drainage to bilateral eyes. No new orders at this time.

## 2021-03-25 NOTE — CONSULTS
[facility]  Report of Inpatient Wound Care Consultation     Brennen Trna Patient Status:  Observation    1965 MRN [id number]   [facility] 3NW-A Attending Zain Blanco MD   Hosp Day # 0 PCP Shemar Orlando (EGD) N/A 10/27/2015    Performed by Goldie Moe MD at [address]      right kidney   • OTHER      tooth extraction   • OTHER      right sided dialysis - right arm fistula   • OTHER      2008 rt arm fistula placed drainage. Dry and stable. Cleansed both, recommend use of Santyl daily to both, Dr. Gallegos Situ to see. Dr. Maria Ines Haji here to see patient. Dressings in computer for daily, will need OP care and follow up for these wounds.      Durable Medical Equipment:  N

## 2021-03-25 NOTE — PLAN OF CARE
Patient A&Ox4, c/o pain this shift, prn medication administered with relief noted.  and telemetry monitoring continued, patient is NSR on monitor. VSS. BS monitored. Right arm and sz precautions in place.  Questions and concerns addressed, none further a appropriate  Outcome: Progressing     Problem: RISK FOR INFECTION - ADULT  Goal: Absence of fever/infection during anticipated neutropenic period  Description: INTERVENTIONS  - Monitor WBC  - Administer growth factors as ordered  - Implement neutropenic gu

## 2021-03-25 NOTE — PROGRESS NOTES
[facility]                                                            Progress Note    Sully De Oliveira Patient Status:  Observation    1965 MRN [id number]   [facility] 3NW-A Attending Jeremi Velázquez, MD   Hosp Day # 0 PCP [facility] SAT collagenase (SANTYL) 250 UNIT/GM ointment, , Topical, Daily  •  HYDROmorphone HCl (DILAUDID) 1 MG/ML injection 0.5-1 mg, 0.5-1 mg, Intravenous, Q30 Min PRN  •  docusate sodium (COLACE) cap 100 mg, 100 mg, Oral, Nightly  •  HYDROcodone-acetaminophen (NORCO) posterior aspect of the heel again about a centimeter and a half in diameter. There is no surrounding inflammation or redness or sign of infection at this time    General: Alert, orientated x3. Cooperative. No apparent distress.   Vital Signs:  Blood pre care come by tomorrow for evaluation and other recommendations. Patient to be nonweightbearing on the forefoot can walk on the heel using a surgical shoe. Doppler arterial studies done on the right side shows minimal flow to the foot.   Patient will need

## 2021-03-25 NOTE — PROGRESS NOTES
Paged vascular MD for new consult. 4432- Writing RN spoke with vascular MD; MD Fagan Needs will be by to see patient today.

## 2021-03-25 NOTE — PROGRESS NOTES
Writing RN paged vascular surgery MD to clarify that Md would be rounding on patient today.  Text page was sent to MD.     1630- Paged hospitalist Md regarding blood sugar of 398.     1642- Writing RN spoke with MD regarding blood sugar and pt's low urine o

## 2021-03-25 NOTE — PROGRESS NOTES
Writing RN provided wound care per order. Ulcer to R lateral and R heel wound cleansed with saline. Covered with santyl cream. Covered with adaptic cover, 4x4 gauze, abd pad and wrapped with kerlix fluffs. Patient tolerated well.      1500- Pt resting at si

## 2021-03-25 NOTE — DIETARY NOTE
[address] [place] [address]     Admitting diagnosis:  Cellulitis of right lower extremity [I09.761]    Ht:  5'8\"  Wt: 84 kg (185 lb 3 oz). Body mass index is 28.16 kg/m².   IBW: 70kg  Wt Readings from Last 6 Encounters:  03/22

## 2021-03-25 NOTE — PROGRESS NOTES
[facility]                INFECTIOUS DISEASE PROGRESS NOTE    Heladio Merida Patient Status:  Observation    1965 MRN [id number]   [facility] 3NW-A Attending Arianna Carvajal MD   Hosp Day # 0 PCP [facility]       Subjective: Blood,peripheral   Result Value Ref Range    Blood Culture Result No Growth 2 Days N/A         Radiology    US pending    Problem list reviewed:  Patient Active Problem List:     Central pontine myelinolysis ([facility] [address]     HIV (human immunodeficiency virus infe

## 2021-03-25 NOTE — PROGRESS NOTES
[facility]  Progress Note     Victoriano Muniz Patient Status:  Observation    1965 MRN [id number]   [facility] 3NW-A Attending Benedict Smith MD   Hosp Day # 0 PCP [facility]     Chief Complaint: Nonhealing right foot wounds GFRNAA 88   CA 9.6   ALB 3.4   *   K 4.0      CO2 26.0   ALKPHO 120*   AST 17   ALT 18   BILT 0.8   TP 7.4       Estimated Creatinine Clearance: 83.2 mL/min (based on SCr of 0.97 mg/dL).     No results for input(s): PTP, INR in the last 168 ho of hemodynamically significant stenosis   3. Appears like it is improving, and no longer tender to touch  4. Continue local wound care  5. Vascular surgery on consult    2. HIV with h/o kaposi's sarcoma  1. Continue home medications    3. Hyponatremia  1.

## 2021-03-25 NOTE — PLAN OF CARE
Pt alert and oriented x4. RA. Tele, NSR HR 70's. Tolerating carb controlled diet. Pt denies Sob, n/v, chest pain. R foot dressing C/D/I, writing RN BRI wound. Per wound care, daily dressing changes; dressing changed with wound care this morning.  Pt up with Administer analgesics based on type and severity of pain and evaluate response  - Implement non-pharmacological measures as appropriate and evaluate response  - Consider cultural and social influences on pain and pain management  - Manage/alleviate anxiety

## 2021-03-26 ENCOUNTER — APPOINTMENT (OUTPATIENT)
Dept: CT IMAGING | Facility: HOSPITAL | Age: 56
DRG: 253 | End: 2021-03-26
Attending: HOSPITALIST
Payer: MEDICARE

## 2021-03-26 PROCEDURE — 99233 SBSQ HOSP IP/OBS HIGH 50: CPT | Performed by: HOSPITALIST

## 2021-03-26 PROCEDURE — 99232 SBSQ HOSP IP/OBS MODERATE 35: CPT | Performed by: PODIATRIST

## 2021-03-26 PROCEDURE — 73706 CT ANGIO LWR EXTR W/O&W/DYE: CPT | Performed by: HOSPITALIST

## 2021-03-26 RX ORDER — SODIUM CHLORIDE 9 MG/ML
INJECTION, SOLUTION INTRAVENOUS CONTINUOUS
Status: DISCONTINUED | OUTPATIENT
Start: 2021-03-26 | End: 2021-03-30
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
## 2021-03-26 NOTE — PROGRESS NOTES
[facility]                INFECTIOUS DISEASE PROGRESS NOTE    [facility]:  [facility]ation    1965 MRN [id number]   [facility] 3NW-A Attending Alondra Tamez MD   Hosp Day # 1 PCP [facility]       Subjective: Collection Time: 03/22/21  4:22 PM    Specimen: Blood,peripheral   Result Value Ref Range    Blood Culture Result No Growth 3 Days N/A         Radiology    US pending    Problem list reviewed:  Patient Active Problem List:     Central pontine myelinolysis
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
## 2021-03-26 NOTE — PLAN OF CARE
PT vss, afebrile, Aox4. Pt reports minor pain managed with norco. SCDs declined. Pt poor eye sight, reports glasses not at hospital. Eyes noted to be red in conjuctiva (especially left eye). Ointment applied per MD orders;  See Mar. Pt refuses eye drops say adequate comfort level or patient's stated pain goal  Description: INTERVENTIONS:  - Encourage pt to monitor pain and request assistance  - Assess pain using appropriate pain scale  - Administer analgesics based on type and severity of pain and evaluate re

## 2021-03-26 NOTE — PAYOR COMM NOTE
--------------  3/26 CONTINUED STAY REVIEW    Payor: BCBS MEDICARE ADV PPO  Subscriber #:  [id number]  Authorization Number: [id number]    HOSPITALIST:  S:   -Patient seen and examined at bedside. Chart and reports reviewed.    -Pt reports his foot aches with local care. SW:  MSW met with the patient at bedside to complete discharge planning assessment. The patient is alert and oriented and making his own decisions.     The patient is a 54year old male admitted for cellulitis.   He [facility] in a home wi [facility]) 5-325 MG per tab 1 tablet     Date Action Dose Route User    3/26/2021 1013 Given 1 tablet Oral Jaylan Hall RN    3/25/2021 2220 Given 1 tablet Oral Stromberg, Ashly, RN      Insulin Aspart Pen (NOVOLOG) 100 UNIT/ML flexpen 1-10 Units     Da Dose Route User    3/26/2021 1012 Given 3 mg Oral Jose Daniel Olson, PORTILLO      Timolol Maleate (TIMOPTIC) 0.25 % ophthalmic solution 1 drop     Date Action Dose Route User    3/26/2021 1000 Given 1 drop Both Eyes Jose Daniel Olson, RN      Venlafaxine HCl (E

## 2021-03-26 NOTE — PROGRESS NOTES
[facility]                                                            Progress Note    Feliciano Juarez Patient Status:  Inpatient    1965 MRN [id number]   [facility] 3NW-A Attending Ирина Horton MD   Hosp Day # 1 PCP [facility] levETIRAcetam (KEPPRA) tab 500 mg, 500 mg, Oral, Daily  •  zolpidem (AMBIEN) tab 5 mg, 5 mg, Oral, Nightly PRN  •  zolpidem (AMBIEN) tab 5 mg, 5 mg, Oral, Nightly  •  apixaban (ELIQUIS) tab 5 mg, 5 mg, Oral, BID  •  acyclovir (ZOVIRAX) cap 200 mg, 200 mg, MED**, 1 tablet, Oral, Nightly  •  Doravirine TABS 1 tablet  **PT OWN MED**, 1 tablet, Oral, Nightly    Physical Exam:  He has a dry eschar about a centimeter and a half underneath the fifth metatarsal head that is tender to palpation and he has a dry esch

## 2021-03-26 NOTE — PLAN OF CARE
Discussed with Dr Rena Boudreaux. Planning for PV angio Monday with our team. Not necessary to see until then.  Please call if any issues over the weekend, otherwise we will see Monday for angiogram    Dakotah Wright NP  3/26/2021  2:43 PM  [phone]

## 2021-03-26 NOTE — PROGRESS NOTES
[facility]  Progress Note     Shay Villar Patient Status:  Inpatient    1965 MRN [id number]   [facility] 3NW-A Attending Linda Dias MD   Hosp Day # 1 PCP [facility]     Chief Complaint: Nonhealing right foot wounds    TP 7.4       Estimated Creatinine Clearance: 83.2 mL/min (based on SCr of 0.97 mg/dL). No results for input(s): PTP, INR in the last 168 hours.          COVID-19 Lab Results    COVID-19  Lab Results   Component Value Date    COVID19 Not Detected 03/22/ 3. Appears like it is improving, and no longer tender to touch  4. Continue local wound care  5. Pt will have CT angio today. Will wait on vascular recs to determine if pt needs angiogram next week.      2. HIV with h/o kaposi's sarcoma  1.  Continue home
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
## 2021-03-27 PROCEDURE — 99233 SBSQ HOSP IP/OBS HIGH 50: CPT | Performed by: HOSPITALIST
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
## 2021-03-27 NOTE — PLAN OF CARE
Alert and oriented  All due medications given  Reports minimal pain  VSS, will continue to monitor.     Problem: Diabetes/Glucose Control  Goal: Glucose maintained within prescribed range  Description: INTERVENTIONS:  - Monitor Blood Glucose as ordered  - A Monitor WBC  - Administer growth factors as ordered  - Implement neutropenic guidelines  Outcome: Progressing     Problem: SAFETY ADULT - FALL  Goal: Free from fall injury  Description: INTERVENTIONS:  - Assess pt frequently for physical needs  - Identify

## 2021-03-28 PROCEDURE — 99233 SBSQ HOSP IP/OBS HIGH 50: CPT | Performed by: HOSPITALIST

## 2021-03-28 RX ORDER — SODIUM CHLORIDE 9 MG/ML
INJECTION, SOLUTION INTRAVENOUS CONTINUOUS
Status: DISCONTINUED | OUTPATIENT
Start: 2021-03-28 | End: 2021-03-30

## 2021-03-28 NOTE — PLAN OF CARE
Alert and oriented  Denies pain, VSS, all due medications given as ordered  Dressing remains intact, reinforced  Pain medication per patient request.    Problem: Diabetes/Glucose Control  Goal: Glucose maintained within prescribed range  Description: INTER neutropenic period  Description: INTERVENTIONS  - Monitor WBC  - Administer growth factors as ordered  - Implement neutropenic guidelines  Outcome: Progressing     Problem: SAFETY ADULT - FALL  Goal: Free from fall injury  Description: INTERVENTIONS:  - As

## 2021-03-28 NOTE — CONSULTS
[facility]    PATIENT'S NAME: Niall Brown   ATTENDING PHYSICIAN: KELECHI Palma [facility]: Sandy Gray M.D.    PATIENT ACCOUNT#:   [de-identified]    LOCATION:  [address] [place] [address]  MEDICAL RECORD #:   [id number]       DATE OF BIRTH:  [birth year]

## 2021-03-28 NOTE — PLAN OF CARE
AOx4  C/O 5/10 pain, PRN given. All meds given as ordered. Ext. PIV in place. Dressing CDI  Dressing remains intact, will change this afternoon. Pt resting in bed. POC discussed.   [facility].    1300: Pt R inner calf wound noted to have quarter size amount o

## 2021-03-29 PROCEDURE — 99232 SBSQ HOSP IP/OBS MODERATE 35: CPT | Performed by: PODIATRIST

## 2021-03-29 PROCEDURE — 99232 SBSQ HOSP IP/OBS MODERATE 35: CPT | Performed by: HOSPITALIST

## 2021-03-29 PROCEDURE — 99233 SBSQ HOSP IP/OBS HIGH 50: CPT | Performed by: NURSE PRACTITIONER

## 2021-03-29 NOTE — CM/SW NOTE
Patient scheduled for angiogram tomorrow. SW will continue to follow for any potential discharge planning needs.     Priti Castillo LCSW leg pain R

## 2021-03-29 NOTE — PROGRESS NOTES
@9895 Paged on call hospitalist MD Antoine Chavez called to verify insulin order, stated to hold novolog and give half of levemir. @6797 Page to MD Sundar Wu about patient on eliquis to verify hold.  MD stated he did not know the patient was still taking it, a

## 2021-03-29 NOTE — PROGRESS NOTES
Assumed care at 1630. Alert & oriented x4. denies pain. With right lower  arm brace in place and old right arm AV fistula,Right arm precautions observed. WITH RIGHT lower leg down to Rt.foot kerlix dressing c/d/i. Able to wiggle all toes ,with <3 seconds capill

## 2021-03-29 NOTE — PROGRESS NOTES
[facility]                                                            Progress Note    Kacy Joya Patient Status:  Inpatient    1965 MRN [id number]   [facility] 3NW-A Attending Elle Deluca MD   Hosp Day # 4 PCP [facility] Sodium (PRAVACHOL) tab 10 mg, 10 mg, Oral, Daily  •  predniSONE (DELTASONE) tab 5 mg, 5 mg, Oral, Daily  •  levETIRAcetam (KEPPRA) tab 500 mg, 500 mg, Oral, Daily  •  zolpidem (AMBIEN) tab 5 mg, 5 mg, Oral, Nightly PRN  •  zolpidem (AMBIEN) tab 5 mg, 5 mg, (PROGRAF) cap 3 mg, 3 mg, Oral, Daily  •  Bictegravir-Emtricitab-Tenofov -25 MG TABS 1 tablet **PT OWN MED**, 1 tablet, Oral, Nightly  •  Doravirine TABS 1 tablet  **PT OWN MED**, 1 tablet, Oral, Nightly    Physical Exam:  He has a dry eschar about a transplant     Seizure disorder [facility])     Atrial fibrillation with rapid ventricular response (HCC)     AIDS (acquired immune deficiency syndrome) ([facility] [address]     Closed displaced avulsion fracture of right talus with routine healing, subsequent encounter     Ce

## 2021-03-29 NOTE — PROGRESS NOTES
[facility]  Progress Note     Darlene Cueto Patient Status:  Inpatient    1965 MRN [id number]   [facility] 3NW-A Attending Scott Wang MD   Hosp Day # 3 PCP [facility]     Chief Complaint: non healing ulcers    S: Patient COVID-19 Lab Results    COVID-19  Lab Results   Component Value Date    COVID19 Not Detected 03/22/2021    COVID19 Not Detected 10/29/2020    COVID19 Not Detected 10/29/2020       Pro-Calcitonin  No results for input(s): PCT in the last 168 hours.     C continue hydration     2. HIV with h/o kaposi's sarcoma  1. Continue home medications     3. Hyponatremia na 133  1. Cont to monitor     4. Atrial fibrillation  1. On eliquis  2. Tele monitoring     5. DM type II  1.  On basal insulin and novolog coverage

## 2021-03-29 NOTE — PROGRESS NOTES
[facility]  Progress Note     Mik Talbot Patient Status:  Inpatient    1965 MRN [id number]   [facility] 3NW-A Attending Ainsley Reddy MD   Hosp Day # 4 PCP [facility]     Chief Complaint: left foot non healing ulcers Detected 03/22/2021    COVID19 Not Detected 10/29/2020    COVID19 Not Detected 10/29/2020       Pro-Calcitonin  No results for input(s): PCT in the last 168 hours. Cardiac  No results for input(s): TROP, PBNP in the last 168 hours.     Creatinine Kinase medications     3. Hyponatremia na 133  1. Cont to monitor     4. Atrial fibrillation  1. On eliquis   2. Tele monitoring     5. DM type II uncontrolled  1. On basal insulin and novolog coverage  2.  Continue Levemir 25 Units qam and start Levemir 15 Units

## 2021-03-29 NOTE — PAYOR COMM NOTE
--------------  CONTINUED STAY REVIEW    Payor: [employer] MEDICARE ADV PPO  Subscriber #:  [id number]  Authorization Number: [id number]    Admit date: 3/25/21  Admit time:  4:12 PM    Admitting Physician: Royal Iman MD  Attending Physician:  Marcela Joe, Subcutaneous (Left Upper Arm) Sherlyn Samuels RN    3/28/2021 1430 Given 6 Units Subcutaneous (Left Upper Arm) Heather Vogel RN    3/28/2021 0930 Given 3 Units Subcutaneous (Left Upper Arm) Heather Vogel RN      Insulin Aspart Pen (NOVOLOG) 100 UNIT/ML f Route User    3/28/2021 2233 Given 1 drop Both [address] [facility]    3/28/2021 6303 Given 1 drop Both Eyes Karen Trejo RN      Venlafaxine HCl [facility]) tab 37.5 mg     Date Action Dose Route User    3/28/2021 2228 Given 37.5 mg Oral Carley Olson, revascularization. The case has been discussed with [facility].   Scheduled for angiogram on Monday.     Dictated By Jerome Booker M.D.  d:     03/27/2021 19:49:56  t:      03/27/2021 20:07:07  Job   [id number]  [facility] [place]                ED Hosp-Admission (Current) on 3/22/2021        Detailed Report     Chart Review: Note Routing History    No routing history on file.

## 2021-03-29 NOTE — PROGRESS NOTES
Vss. Pt resting in bed. Pt ambulated to bathroom this am without difficulty using walking boot and cane. Dressing to right leg changed.  No drainage noted on old dressing but while removing dressing scabbed removed from wound to interior right lower leg abo

## 2021-03-29 NOTE — PROGRESS NOTES
[facility] Progress Note    Subjective:   Patient sitting in bed upon exam, states he is overall doing pretty well today. Continues to report RLE pain, particularly with dressing changes, especially if not premedicated.   Denies any chest pa mmHg across the aortic valve and max velocity of      2.8m/sec. 3. Aortic root: The aortic root was not dilated. 4. Mitral valve: Mildly calcified annulus. 5. Left atrium: The left atrium was mildly dilated.  The left atrial volume      was normal. Vitamin D3, Cholecalciferol, (VITAMIN D3) 125 MCG (5000 UT) Oral Cap, Take 5,000 Units by mouth daily. , Disp: , Rfl:   insulin glargine 100 UNIT/ML Subcutaneous Solution, Inject 20 Units into the skin nightly., Disp: , Rfl:   Doravirine 100 MG Oral Tab, sarcoma. On ART. · Hx of AF, on Eliquis. Currently in NSR, Eliquis on hold for angio. · DM type 2  · Hx of renal transplant, on anti-rejection meds. Monitor renal function postprocedure, could consider nephrology consult post-angio, if needed.     Plan:

## 2021-03-29 NOTE — PLAN OF CARE
Patient A&Ox4, VSS. C/O pain prn medication administered. Blood sugar monitored insulin given as ordered for procedure (please see previous note). IVF initiated.  and telemetry monitoring continued. Eliquis held for procedure (please see previous note). MD/LIP if interventions unsuccessful or patient reports new pain  - Anticipate increased pain with activity and pre-medicate as appropriate  Outcome: Progressing     Problem: RISK FOR INFECTION - ADULT  Goal: Absence of fever/infection during anticipated n

## 2021-03-29 NOTE — PLAN OF CARE
Problem: Diabetes/Glucose Control  Goal: Glucose maintained within prescribed range  Description: INTERVENTIONS:  - Monitor Blood Glucose as ordered  - Assess for signs and symptoms of hyperglycemia and hypoglycemia  - Administer ordered medications to m Progressing     Problem: SAFETY ADULT - FALL  Goal: Free from fall injury  Description: INTERVENTIONS:  - Assess pt frequently for physical needs  - Identify cognitive and physical deficits and behaviors that affect risk of falls.   - [place] fall precaut

## 2021-03-30 ENCOUNTER — APPOINTMENT (OUTPATIENT)
Dept: INTERVENTIONAL RADIOLOGY/VASCULAR | Facility: HOSPITAL | Age: 56
DRG: 253 | End: 2021-03-30
Attending: SURGERY
Payer: MEDICARE

## 2021-03-30 PROCEDURE — B410YZZ FLUOROSCOPY OF ABDOMINAL AORTA USING OTHER CONTRAST: ICD-10-PCS | Performed by: INTERNAL MEDICINE

## 2021-03-30 PROCEDURE — 047R3ZZ DILATION OF RIGHT POSTERIOR TIBIAL ARTERY, PERCUTANEOUS APPROACH: ICD-10-PCS | Performed by: INTERNAL MEDICINE

## 2021-03-30 PROCEDURE — 99232 SBSQ HOSP IP/OBS MODERATE 35: CPT | Performed by: HOSPITALIST

## 2021-03-30 PROCEDURE — 047T3ZZ DILATION OF RIGHT PERONEAL ARTERY, PERCUTANEOUS APPROACH: ICD-10-PCS | Performed by: INTERNAL MEDICINE

## 2021-03-30 PROCEDURE — B41FYZZ FLUOROSCOPY OF RIGHT LOWER EXTREMITY ARTERIES USING OTHER CONTRAST: ICD-10-PCS | Performed by: INTERNAL MEDICINE

## 2021-03-30 RX ORDER — CLOPIDOGREL BISULFATE 75 MG/1
150 TABLET ORAL ONCE
Status: COMPLETED | OUTPATIENT
Start: 2021-03-30 | End: 2021-03-30

## 2021-03-30 RX ORDER — CEFAZOLIN SODIUM/WATER 2 G/20 ML
2 SYRINGE (ML) INTRAVENOUS EVERY 8 HOURS
Status: DISCONTINUED | OUTPATIENT
Start: 2021-03-30 | End: 2021-03-31

## 2021-03-30 RX ORDER — CLOPIDOGREL BISULFATE 75 MG/1
75 TABLET ORAL DAILY
Status: DISCONTINUED | OUTPATIENT
Start: 2021-03-30 | End: 2021-04-03

## 2021-03-30 RX ORDER — CLOPIDOGREL BISULFATE 75 MG/1
75 TABLET ORAL DAILY
Status: DISCONTINUED | OUTPATIENT
Start: 2021-03-31 | End: 2021-03-30

## 2021-03-30 RX ORDER — HYDROCODONE BITARTRATE AND ACETAMINOPHEN 5; 325 MG/1; MG/1
2 TABLET ORAL EVERY 4 HOURS PRN
Status: DISCONTINUED | OUTPATIENT
Start: 2021-03-30 | End: 2021-04-03

## 2021-03-30 RX ORDER — LIDOCAINE HYDROCHLORIDE 10 MG/ML
INJECTION, SOLUTION EPIDURAL; INFILTRATION; INTRACAUDAL; PERINEURAL
Status: COMPLETED
Start: 2021-03-30 | End: 2021-03-30

## 2021-03-30 RX ORDER — CLOPIDOGREL BISULFATE 75 MG/1
TABLET ORAL
Status: COMPLETED
Start: 2021-03-30 | End: 2021-03-30

## 2021-03-30 RX ORDER — HEPARIN SODIUM 5000 [USP'U]/ML
INJECTION, SOLUTION INTRAVENOUS; SUBCUTANEOUS
Status: COMPLETED
Start: 2021-03-30 | End: 2021-03-30

## 2021-03-30 RX ORDER — HYDRALAZINE HYDROCHLORIDE 20 MG/ML
INJECTION INTRAMUSCULAR; INTRAVENOUS
Status: COMPLETED
Start: 2021-03-30 | End: 2021-03-30

## 2021-03-30 RX ORDER — SODIUM CHLORIDE 9 MG/ML
INJECTION, SOLUTION INTRAVENOUS CONTINUOUS
Status: ACTIVE | OUTPATIENT
Start: 2021-03-30 | End: 2021-03-30

## 2021-03-30 RX ORDER — VERAPAMIL HYDROCHLORIDE 2.5 MG/ML
INJECTION, SOLUTION INTRAVENOUS
Status: COMPLETED
Start: 2021-03-30 | End: 2021-03-30

## 2021-03-30 RX ORDER — HYDROCODONE BITARTRATE AND ACETAMINOPHEN 5; 325 MG/1; MG/1
1 TABLET ORAL EVERY 4 HOURS PRN
Status: DISCONTINUED | OUTPATIENT
Start: 2021-03-30 | End: 2021-04-03

## 2021-03-30 RX ORDER — DIPHENHYDRAMINE HYDROCHLORIDE 50 MG/ML
INJECTION INTRAMUSCULAR; INTRAVENOUS
Status: COMPLETED
Start: 2021-03-30 | End: 2021-03-30

## 2021-03-30 RX ORDER — ACETAMINOPHEN 325 MG/1
650 TABLET ORAL EVERY 4 HOURS PRN
Status: DISCONTINUED | OUTPATIENT
Start: 2021-03-30 | End: 2021-04-03

## 2021-03-30 RX ORDER — NITROGLYCERIN 20 MG/100ML
INJECTION INTRAVENOUS
Status: COMPLETED
Start: 2021-03-30 | End: 2021-03-30

## 2021-03-30 RX ORDER — MIDAZOLAM HYDROCHLORIDE 1 MG/ML
INJECTION INTRAMUSCULAR; INTRAVENOUS
Status: COMPLETED
Start: 2021-03-30 | End: 2021-03-30

## 2021-03-30 NOTE — PROCEDURES
[facility]    [facility] Cardiac Cath Procedure Note  Justin Lucy Patient Status:  Inpatient    1965 MRN [id number]   Location [address] [facility] Attending Kaylan Alarcon MD   Hosp Day # 5 PCP [facility]       Cardiologist: stenosis    Right:  Common iliac: 20 to 30% stenosis  Internal iliac:  In the midportion there appears to be 80 to 90% stenosis  External iliac: Mild irregularities (filling of the transplanted kidney is noted likely from the external iliac)  Common femoral micropuncture sheath was exchanged over wire for a 5 Romanian sheath. We then gently advanced a pigtail catheter over wire into the distal abdominal aorta, distal abdominal aortogram with distal runoff was performed.   Next the pigtail catheter was then exch Next the Corsair catheter was then removed. We then plan on performing PTA of the posterior tibial first.  Next we then advanced a 3.0 x 150 mm balloon which was used to perform PTA of distal TP trunk to midportion of the posterior tibial artery.   Next th wire was then pulled back. We then performed a limited left iliofemoral angiogram to evaluate for patient's candidacy for closure device placement.   Hemostasis to the left common femoral artery was obtained using 6 Western Apoorva Angio-Seal, excellent hemostasis

## 2021-03-30 NOTE — PROGRESS NOTES
[facility]                INFECTIOUS DISEASE PROGRESS NOTE    Marli Ford Patient Status:  Observation    1965 MRN [id number]   [facility] 3NW-A Attending Marcela Joe MD   Hosp Day # 5 PCP [facility]       Subjective: ALB 3.2*  --   --    * 136 134*   K 4.6 4.1 4.1    104 102   CO2 28.0 27.0 26.0   ALKPHO 136*  --   --    AST 23  --   --    ALT 21  --   --    BILT 0.5  --   --    TP 7.2  --   --        No results found for: [facility] MD  [facility]  [phone]

## 2021-03-30 NOTE — PLAN OF CARE
Pt a&ox4, VSS, afebrile. Tele monitoring. Dressing to right leg c/d/i. Pt reports moderate pain to right foot, prn norco administered per mar. Eliquis held for angiogram in the morning. Pt denies nausea. Right limb precautions for old AV fistula.  Pt NPO at Manage/alleviate anxiety  - Utilize distraction and/or relaxation techniques  - Monitor for opioid side effects  - Notify MD/LIP if interventions unsuccessful or patient reports new pain  - Anticipate increased pain with activity and pre-medicate as approp

## 2021-03-30 NOTE — PROGRESS NOTES
Cardiology Update: On follow-up patient notes that he feels well, he notes that his right foot pain is improved compared to prior. He denies any abdominal pain or left groin pain. He remains hemodynamically stable.   Left common femoral access site CDI

## 2021-03-30 NOTE — CONSULTS
ORTHO CONSULT NOTE    Patient Identification:        Name: Rafiq Oro  Age: 54year old  Sex: male  :  1965  MRN: [id number]                                              Requesting Physician: Dr Luis Escamilla      HPI:        Rafiq Oro is a 54 ye fibrillation with RVR (HCC)     Diabetes mellitus ([address]     S/p cadaver renal transplant     Seizure disorder ([address]     Atrial fibrillation with rapid ventricular response ([address]     AIDS (acquired immune deficiency syndrome) ([address]     Closed displaced avul extraction   • OTHER      right sided dialysis - right arm fistula   • OTHER      2008 rt arm fistula placed   • OTHER      LUCIUS knees   • OTHER SURGICAL HISTORY  3/13/14    Right patellar tendon repair   • PATELLA OPEN REDUCTION INTERNAL FIXATION Right 3/1 Past Month at Unknown time  docusate sodium 100 MG Oral Cap, Take 100 mg by mouth nightly., Disp: , Rfl: , 3/21/2021 at Unknown time  mycophenolate sodium 360 MG Oral Tab EC, Take 360 mg by mouth every 12 (twelve) hours. , Disp: , Rfl: , 3/21/2021 at [facility] (VERSED) 2 MG/2ML injection, , ,   [COMPLETED] fentaNYL citrate (SUBLIMAZE) 0.05 MG/ML injection, , ,   [COMPLETED] Heparin Sodium (Porcine) 5000 UNIT/ML injection, , ,   [COMPLETED] Verapamil HCl (ISOPTIN) 2.5 MG/ML injection, , ,   [COMPLETED] Nitroglyce Min PRN   Or  Glucose-Vitamin C (DEX-4) chewable tab 8 tablet, 8 tablet, Oral, Q15 Min PRN  Insulin Aspart Pen (NOVOLOG) 100 UNIT/ML flexpen 1-68 Units, 1-68 Units, Subcutaneous, TID CC  collagenase (SANTYL) 250 UNIT/GM ointment, , Topical, Daily  [ mg, 1 mg, Intravenous, Q2H PRN   Or  morphINE sulfate (PF) 2 MG/ML injection 2 mg, 2 mg, Intravenous, Q2H PRN   Or  morphINE sulfate (PF) 4 MG/ML injection 4 mg, 4 mg, Intravenous, Q2H PRN  glucose (DEX4) oral liquid 15 g, 15 g, Oral, Q15 Min PRN   Or  Glu (Last 24 hours) at 3/30/2021 1725  Last data filed at 3/30/2021 0557  Gross per 24 hour   Intake 552 ml   Output 450 ml   Net 102 ml       Physical Exam:        General - awake, alert, and oriented x 3, answers questions appropriately, well nourished, no a recommend any hardware removal until this fracture heals. Recommend close follow up with Dr. Maria Victoria Chen to continue to follow healing and consider possible removal of IMN as needed once healed.      Please obtain ESR, CRP to trend any possible infection     ok

## 2021-03-30 NOTE — PROGRESS NOTES
Procedure with DR. Vita Sandoval today with right lower leg angio/ Aortoiliac angio/ Right Tibial- peroneal trunk/ Posterior tibial/ peroneal PTA. Right foot - dry small eschar heel/ 1x1 cm eschar plantar aspect right 4th/ 5th digit.  Has new area of drainage right

## 2021-03-30 NOTE — PAYOR COMM NOTE
--------------  CONTINUED STAY REVIEW    Payor: [employer] MEDICARE ADV PPO  Subscriber #:  [id number]  Authorization Number: [id number]    Admit date: 3/25/21  Admit time:  4:12 PM    Admitting Physician: Skip Andersen MD  Attending Physician:  Sarwat Marks, Injection Marcelino Schultz MD      Metoprolol Succinate ER (Toprol XL) 24 hr tab 50 mg     Date Action Dose Route User    3/30/2021 0550 Given 50 mg Oral Sally Ye RN    3/29/2021 0110 Given 50 mg Oral Marty Marie RN      mycophenolate sodium (MYF

## 2021-03-31 PROCEDURE — 99232 SBSQ HOSP IP/OBS MODERATE 35: CPT | Performed by: INTERNAL MEDICINE

## 2021-03-31 PROCEDURE — 99233 SBSQ HOSP IP/OBS HIGH 50: CPT | Performed by: HOSPITALIST

## 2021-03-31 RX ORDER — TRAMADOL HYDROCHLORIDE 50 MG/1
100 TABLET ORAL EVERY 6 HOURS PRN
Status: DISCONTINUED | OUTPATIENT
Start: 2021-03-31 | End: 2021-04-03

## 2021-03-31 RX ORDER — DIPHENHYDRAMINE HYDROCHLORIDE 50 MG/ML
50 INJECTION INTRAMUSCULAR; INTRAVENOUS EVERY 12 HOURS
Status: DISCONTINUED | OUTPATIENT
Start: 2021-03-31 | End: 2021-04-01

## 2021-03-31 RX ORDER — VANCOMYCIN 2 GRAM/500 ML IN 0.9 % SODIUM CHLORIDE INTRAVENOUS
25 ONCE
Status: COMPLETED | OUTPATIENT
Start: 2021-03-31 | End: 2021-03-31

## 2021-03-31 RX ORDER — VANCOMYCIN HYDROCHLORIDE
15 EVERY 12 HOURS
Status: DISCONTINUED | OUTPATIENT
Start: 2021-04-01 | End: 2021-04-03

## 2021-03-31 RX ORDER — TRAMADOL HYDROCHLORIDE 50 MG/1
50 TABLET ORAL EVERY 6 HOURS PRN
Status: DISCONTINUED | OUTPATIENT
Start: 2021-03-31 | End: 2021-04-03

## 2021-03-31 NOTE — PAYOR COMM NOTE
--------------  CONTINUED STAY REVIEW    Payor: [employer] MEDICARE ADV PPO  Subscriber #:  [id number]  Authorization Number: [id number]    Admit date: 3/25/21  Admit time:  4:12 PM    Admitting Physician: Royal Iman MD  Attending Physician:  Marcela Joe, irregularities  Internal iliac: 20 to 30% stenosis  External iliac: 10 to 20% stenosis     Right:  Common iliac: 20 to 30% stenosis  Internal iliac:  In the midportion there appears to be 80 to 90% stenosis  External iliac: Mild irregularities (filling of t 3/30/2021 1831 Given 2 g Intravenous Mariella Mckenzie RN      Clopidogrel Bisulfate (PLAVIX) tab 150 mg     Date Action Dose Route User    3/30/2021 1342 Given by Other 150 mg Oral Mariella Mckenzie RN      Clopidogrel Bisulfate (PLAVIX) tab 75 mg     Da Route User    3/31/2021 1116 Given 25 Units Subcutaneous (Left Upper Arm) Steven Sharp RN    3/30/2021 1540 Given 25 Units Subcutaneous (Left Upper Abdomen) Damián Ramires RN      levETIRAcetam (KEPPRA) tab 500 mg     Date Action Dose Route User Dose Route User    3/31/2021 1148 Given 50 mg Oral Lorracarlos Rachel RN      Venlafaxine HCl [facility]) tab 37.5 mg     Date Action Dose Route User    3/30/2021 2131 Given 37.5 mg Oral Keila Cantu RN      zolpidem [facility]) tab 5 mg     Date Action Dos flow improves his wound healing  - Restarted on eliquis  - Cont BB for rate control  - Cr stable  - Will defer management of renal transplant medications and renal management as per primary service and nephrology if needed  - Discussed with patient     Man ALKPHO 136* --  --  --  --    AST 23 --  --  --  --    ALT 21 --  --  --  --    BILT 0.5 --  --  --  --    TP 7.2 --  --  --  --    < > = values in this interval not displayed. Estimated Creatinine Clearance: 99.7 mL/min (based on SCr of 0.81 mg/dL). type 2   A1c 8.3   Better control today   Continue Levemir and Novolog   5.s/p renal transplant   -cr at baseline   Plan of care: pain meds   Quality:   DVT Prophylaxis: Eliquis   CODE status: full   Weiner: no   Central line: no   If COVID testing is negat

## 2021-03-31 NOTE — DIETARY NOTE
[address] [place] [address]     Admitting diagnosis: Cellulitis of right lower extremity [L78.217]    Ht:  5'8\"  Wt: 84 kg (185 lb 3 oz). Body mass index is 28.16 kg/m².   IBW: 70kg  Wt Readings from Last 6 Encounters:  03/22/

## 2021-03-31 NOTE — PLAN OF CARE
Received pt from cath lab around 1130  Pt drowsy  L groin site soft, no hematoma. Bilat pedal and post tib pulses confirmed with doppler  RLE dressing changed. Sanguinous drainage from medial leg wound  Ortho consulted.  ID reconsulted  Mother updated via p

## 2021-03-31 NOTE — PROGRESS NOTES
[facility] Dosing Service    Initial Pharmacokinetic Consult for Vancomycin Dosing     Karyle Hilda is a 54year old patient who is being treated for  R/O MRSA .   Pharmacy has been asked to dose Vancomycin by Freeman ENGLISH    Allergies:  Renan Zuniga over 4 hours    2. Vancomycin trough will be obtained prior to the 4 th dose. Goal trough level 15-20 ug/mL. 3.  Will monitor SCr daily while on Vancomycin to assess renal function. Pharmacy will continue to follow and adjust as necessary.     Mariela Taveras

## 2021-03-31 NOTE — PROGRESS NOTES
[facility]                INFECTIOUS DISEASE PROGRESS NOTE    Justin Ayala Patient Status:  Observation    1965 MRN [id number]   [facility] 3NW-A Attending Kaylan Alarcon MD   Hosp Day # 6 PCP [facility]     Abx: Ancef D# 19* 19*   CREATSERUM 0.96   < > 0.79 0.84 0.81   GFRAA 102   < > 117 114 116   GFRNAA 89   < > 101 99 100   CA 9.5   < > 9.3 9.2 9.0   ALB 3.2*  --   --   --   --    *   < > 136 134* 136   K 4.6   < > 4.1 4.1 4.0      < > 104 102 107   CO2 28.0 disorder ([facility]     Atrial fibrillation with rapid ventricular response (HCC)     AIDS (acquired immune deficiency syndrome) ([facility]     Closed displaced avulsion fracture of right talus with routine healing, subsequent encounter     Cellulitis of right lower

## 2021-03-31 NOTE — PLAN OF CARE
Assumed care at [address] restarted per order  Groin site soft, no hematoma  Pulses present  Sanguinous drainage on dressing - cleaned and redressed  Patient only urinating x1 overnight - bladder scan 125 ml    Continue IV Ancef, follow cultures, wound

## 2021-03-31 NOTE — PROGRESS NOTES
[facility]  Progress Note     Karyle Hilda Patient Status:  Inpatient    1965 MRN [id number]   [facility] [place] 7NE-A Attending Emilia Alexander MD   Hosp Day # 5 PCP [facility]     Chief Complaint: non healing foot ulcers    S: Pa COVID19 Not Detected 03/30/2021    COVID19 Not Detected 03/22/2021    COVID19 Not Detected 10/29/2020       Pro-Calcitonin  No results for input(s): PCT in the last 168 hours. Cardiac  No results for input(s): TROP, PBNP in the last 168 hours.     [place] home medications     3. Hyponatremia na 133  1. Cont to monitor     4. Atrial fibrillation  1. On eliquis   2. Tele monitoring     5. DM type II uncontrolled  1. On basal insulin and novolog coverage  2.  Continue Levemir 25 Units qam and start Levemir 15 U

## 2021-03-31 NOTE — PROGRESS NOTES
No complaints- new wound at fracture site repair- ID/ Ortho consulted.  Repair appears patent - will do blood flow studies

## 2021-03-31 NOTE — PROGRESS NOTES
[facility]  Progress Note     Mik Talbot Patient Status:  Inpatient    1965 MRN [id number]   [facility] 7NE-A Attending Ainsley Reddy MD   Hosp Day # 6 PCP [facility]     Chief Complaint: non healing ulcers    S: Patient --     < > = values in this interval not displayed. Estimated Creatinine Clearance: 99.7 mL/min (based on SCr of 0.81 mg/dL). No results for input(s): PTP, INR in the last 168 hours.          COVID-19 Lab Results    COVID-19  Lab Results   Componen transplant  -cr at baseline    Plan of care: pain meds    Quality:  · DVT Prophylaxis: Eliquis  · CODE status: full  · Weiner: no  · Central line: no  · If COVID testing is negative, may discontinue isolation: yes     Will the patient be referred to [facility] on

## 2021-03-31 NOTE — PROGRESS NOTES
[facility]  Cardiology Progress Note    Subjective:  No chest pain or shortness of breath.  Sitting comfortably this AM.    Objective:  /53 (BP Location: Left arm)   Pulse 72   Temp 97.7 °F (36.5 °C) (Oral)   Resp 19   Wt 185 lb 3 oz (84 kg)   Sp intervention with PTA of the R PT and Peroneal and complex bifurcation intervention with kissing balloon inflation of the distal TP trunk 3/30 MN  · RLE 5th metatarsal nonhealing wounds  · HIV w kaposi's sarcoma  · AF: on eliquis. Rates controlled.  In SR i

## 2021-04-01 ENCOUNTER — APPOINTMENT (OUTPATIENT)
Dept: NUCLEAR MEDICINE | Facility: HOSPITAL | Age: 56
DRG: 253 | End: 2021-04-01
Attending: INTERNAL MEDICINE
Payer: MEDICARE

## 2021-04-01 ENCOUNTER — HOSPITAL ENCOUNTER (INPATIENT)
Dept: ULTRASOUND IMAGING | Facility: HOSPITAL | Age: 56
Discharge: HOME | DRG: 253 | End: 2021-04-01
Attending: SURGERY
Payer: MEDICARE

## 2021-04-01 PROCEDURE — 78802 RP LOCLZJ TUM WHBDY 1 D IMG: CPT | Performed by: INTERNAL MEDICINE

## 2021-04-01 PROCEDURE — 78102 BONE MARROW IMAGING LTD: CPT | Performed by: INTERNAL MEDICINE

## 2021-04-01 PROCEDURE — 93926 LOWER EXTREMITY STUDY: CPT | Performed by: SURGERY

## 2021-04-01 PROCEDURE — 99232 SBSQ HOSP IP/OBS MODERATE 35: CPT | Performed by: PODIATRIST

## 2021-04-01 PROCEDURE — 99232 SBSQ HOSP IP/OBS MODERATE 35: CPT | Performed by: INTERNAL MEDICINE

## 2021-04-01 PROCEDURE — 99222 1ST HOSP IP/OBS MODERATE 55: CPT | Performed by: HOSPITALIST

## 2021-04-01 RX ORDER — DIPHENHYDRAMINE HCL 25 MG
50 CAPSULE ORAL EVERY 12 HOURS
Status: DISCONTINUED | OUTPATIENT
Start: 2021-04-01 | End: 2021-04-01

## 2021-04-01 NOTE — PROGRESS NOTES
[facility]                INFECTIOUS DISEASE PROGRESS NOTE    Bernell Filter Patient Status:  Observation    1965 MRN [id number]   [facility] 3NW-A Attending Beatrice Mayo MD   Hosp Day # 7 PCP [facility]     Abx: Ancef -- CREATSERUM 0.96   < > 0.79   < > 0.84 0.81 0.84   GFRAA 102   < > 117   < > 114 116 114   GFRNAA 89   < > 101   < > 99 100 99   CA 9.5   < > 9.3  --  9.2 9.0  --    ALB 3.2*  --   --   --   --   --   --    *   < > 136  --  134* 136  --    K 4.6   < Seizures ([facility]     Atrial fib/flutter, transient     Kaposi sarcoma ([facility]     Right knee pain     Fracture of patella, right, closed     rt patellar tendon repair-global thru 6/11/14     Closed fracture of patella     Hematemesis with nausea     Right arm

## 2021-04-01 NOTE — PROGRESS NOTES
[facility]                                                            Progress Note    Victoriano Muniz Patient Status:  Inpatient    1965 MRN [id number]   [facility] 7NE-A Attending Benedict Smith MD   Hosp Day # 7 PCP [facility] glucose (DEX4) oral liquid 15 g, 15 g, Oral, Q15 Min PRN **OR** Glucose-Vitamin C (DEX-4) chewable tab 4 tablet, 4 tablet, Oral, Q15 Min PRN **OR** dextrose 50 % injection 50 mL, 50 mL, Intravenous, Q15 Min PRN **OR** glucose (DEX4) oral liquid 30 g, 30 g, 4 MG/ML injection 4 mg, 4 mg, Intravenous, Q2H PRN  •  glucose (DEX4) oral liquid 15 g, 15 g, Oral, Q15 Min PRN **OR** Glucose-Vitamin C (DEX-4) chewable tab 4 tablet, 4 tablet, Oral, Q15 Min PRN **OR** dextrose 50 % injection 50 mL, 50 mL, Intravenous, Q1 (human immunodeficiency virus infection) ([facility]     Unspecified vitamin D deficiency     Type 2 diabetes mellitus without complication ([facility]     Dry eye syndrome     ESRD (end stage renal disease) ([facility]     Essential hypertension     Seizures ([facility]     Brigid Nassar

## 2021-04-01 NOTE — PLAN OF CARE
Assumed care at 0700. Pt A/O x4. RA. NSR on tele. VSS. Denies any pain. Indium scan started, last part of imaging will be done tomorrow around 0900. Vanco ordered to be given without benadryl this evening per Dr. Marc Espino. Vanco given without any issues.  R a
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
## 2021-04-01 NOTE — PROGRESS NOTES
[facility]  Progress Note     Vanetta Fernville Patient Status:  Inpatient    1965 MRN [id number]   [facility] 7NE-A Attending Mitchell Lee MD   Hosp Day # 7 PCP [facility]     Chief Complaint: non healing ulcers rt foot    S: 28.0   < > 27.0  --  26.0 25.0  --    ALKPHO 136*  --   --   --   --   --   --    AST 23  --   --   --   --   --   --    ALT 21  --   --   --   --   --   --    BILT 0.5  --   --   --   --   --   --    TP 7.2  --   --   --   --   --   --     < > = values in Bictegravir-Emtricitab-Tenofov  1 tablet Oral Nightly   • Doravirine  1 tablet Oral Nightly       ASSESSMENT / PLAN:     1. RLE ant ankle wound- cx pos for gram pos cocci in pairs and clusters-MRSA  - on Ancef-d/robert and now on vanco  -hx of red man sdr

## 2021-04-01 NOTE — PROGRESS NOTES
[facility]                INFECTIOUS DISEASE PROGRESS NOTE    Jian Puentes Patient Status:  Observation    1965 MRN [id number]   [facility] 3NW-A Attending Vaishnavi Segovia MD   Hosp Day # 7 PCP [facility]#1    Sub --    ALB 3.2*  --   --   --   --   --   --    *   < > 136  --  134* 136  --    K 4.6   < > 4.1  --  4.1 4.0  --       < > 104  --  102 107  --    CO2 28.0   < > 27.0  --  26.0 25.0  --    ALKPHO 136*  --   --   --   --   --   --    AST 23  -- Closed fracture of patella     Hematemesis with nausea     Right arm numbness     Moderate nonproliferative diabetic retinopathy without macular edema associated with type 1 diabetes mellitus (HCC)     Acute chest pain     Atrial fibrillation with RVR ([address]

## 2021-04-01 NOTE — PLAN OF CARE
Vanco loading dose infusing well. Benadryl was given 30 min prior. No s/s of reaction. Will cont to monitor.

## 2021-04-01 NOTE — PROGRESS NOTES
No complaints. Patent repair/ S. Aureus right calf wound per ID.  Notes read from Ortho- patient aware he needs to follow up with Dr. Sagrario Flores at [facility]

## 2021-04-02 ENCOUNTER — APPOINTMENT (OUTPATIENT)
Dept: NUCLEAR MEDICINE | Facility: HOSPITAL | Age: 56
DRG: 253 | End: 2021-04-02
Attending: INTERNAL MEDICINE
Payer: MEDICARE

## 2021-04-02 VITALS
SYSTOLIC BLOOD PRESSURE: 143 MMHG | DIASTOLIC BLOOD PRESSURE: 54 MMHG | WEIGHT: 185.19 LBS | RESPIRATION RATE: 16 BRPM | BODY MASS INDEX: 28 KG/M2 | HEART RATE: 73 BPM | TEMPERATURE: 98 F | OXYGEN SATURATION: 99 %

## 2021-04-02 PROCEDURE — 99239 HOSP IP/OBS DSCHRG MGMT >30: CPT | Performed by: HOSPITALIST

## 2021-04-02 PROCEDURE — 99232 SBSQ HOSP IP/OBS MODERATE 35: CPT | Performed by: INTERNAL MEDICINE

## 2021-04-02 RX ORDER — DIPHENHYDRAMINE HCL 25 MG
25 CAPSULE ORAL 2 TIMES DAILY PRN
Status: DISCONTINUED | OUTPATIENT
Start: 2021-04-02 | End: 2021-04-03

## 2021-04-02 NOTE — PAYOR COMM NOTE
--------------  4/1 CONTINUED STAY REVIEW    Payor: [employer] MEDICARE ADV PPO  Subscriber #:  [id number]  Authorization Number: [id number]      4/1:    ID:  Rudolph#[id number]     Subjective:  Co pain right lower leg  --noted increased purulent drainage     Objective: Date Action Dose Route User    4/1/2021 2043 Given 100 mg Oral Merced Castro, RN      Doravirine TABS 1 tablet  **PT OWN MED**     Date Action Dose Route User    4/1/2021 2044 Given 1 tablet Oral PORTILLO Garvey (PRAVACHOL) tab 10 mg     Date Action Dose Route User    4/2/2021 1043 Given 10 mg Oral Talat Bronson RN      predniSONE (DELTASONE) tab 5 mg     Date Action Dose Route User    4/2/2021 1044 Given 5 mg Oral Talat Bronson, RN      tacrolimus (PROGRAF) ca

## 2021-04-02 NOTE — PLAN OF CARE
Assumed care @ 1900. On telemetry monitoring. Updated patient with plan of care  Ensures patient safety. Maintained a calm and safe environment. Side rails up x2. Needs attended. Call light within reach, will continue to monitor.    Instructed to carol increased pain with activity and pre-medicate as appropriate  Outcome: Progressing     Problem: RISK FOR INFECTION - ADULT  Goal: Absence of fever/infection during anticipated neutropenic period  Description: INTERVENTIONS  - Monitor WBC  - Administer grow

## 2021-04-02 NOTE — PLAN OF CARE
Assumed care @9750  VSS,   A&Ox4,   RA    NSR ,   Denies Pain,   Up with 1 assist and cane as tolerated. Tolerating Regular diet. Intake and outputs w/d/l.    R foot redressed. SS drainage. C/D/I  Case mgmt working on transferring pt to [address] [place] [address].     Upd increased pain with activity and pre-medicate as appropriate  Outcome: Progressing     Problem: RISK FOR INFECTION - ADULT  Goal: Absence of fever/infection during anticipated neutropenic period  Description: INTERVENTIONS  - Monitor WBC  - Administer grow

## 2021-04-02 NOTE — CONSULTS
[facility] dosing service    Follow-up Pharmacokinetic Consult for Vancomycin Dosing     Percy Cardona is a 54year old patient who is being treated for MRSA/cellulitis.    Patient is on day 3 of Vancomycin and is currently receiving 1.25 gm IV Q 12 rita * This antibiotic was tested by nAdrea Valdez disk diffusion and result is not an RICK. Based on the above:    1.  Continue Vancomycin at 1.25 gm IVPB Q 12 hours (based on Trough of 18.7 ug/mL, pharmacokinetics, actual body weight of 84 kg and renal fu
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
## 2021-04-02 NOTE — CM/SW NOTE
Transfer to [facility] ordered received. Called over to [facility] transfer center [phone] fax [phone]. Gave them pt's information and faxed over facesheet - they have pt on their transfer board.     CM/SW to f/u with potential transfer to [facility]

## 2021-04-02 NOTE — PROGRESS NOTES
No complaints. Improved flow right lower leg Dry eschars in foot. Drainage right calf wound over fracture site with christal rods. Will probably need to follow up with [facility]. Told to return to me for his vascular care. All questions answered.

## 2021-04-02 NOTE — PROGRESS NOTES
[facility]                INFECTIOUS DISEASE PROGRESS NOTE    Rima Ramirez Patient Status:  Observation    1965 MRN [id number]   [facility] 3NW-A Attending Kristi Paulson MD   Hosp Day # 8 PCP RUST#2    Sub --   --    K 4.1  --  4.1  --  4.0  --   --      --  102  --  107  --   --    CO2 27.0  --  26.0  --  25.0  --   --     < > = values in this interval not displayed.        No results found for: [facility] Encounter on 03/22/21   1 diabetes mellitus (HCC)     Acute chest pain     Atrial fibrillation with RVR (HCC)     Diabetes mellitus ([address]     S/p cadaver renal transplant     Seizure disorder (HCC)     Atrial fibrillation with rapid ventricular response ([address]     AIDS (acquired imm

## 2021-04-03 NOTE — PROGRESS NOTES
Assumed care at [address]. Patient A&Ox4. Tele SR, on room air. Pain to RLE, PRN norco with relief. Dressing to RLE c/d/i. Bed available at [facility], hospitalist notified. Report given to receiving RN. Patient belongings sent with patient.   Transferred to

## 2021-04-03 NOTE — DISCHARGE SUMMARY
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 His renal fct is stable despite receiving iodine iv dyex2. He is hemodynamically stable for transfer to medical floor at [facility]. Sign out given to Dr Leonel Rivas.     Lace+ Score: 65  59- abnormal sulfur colloid accumulation however there is no definitive   photopenic area within the calcaneus or 5th metatarsophalangeal joint either on the sulfur colloid exam.  These findings are indeterminate and could reflect soft tissue infection althoug 12 (twelve) hours. Refills: 0     Artificial Tears 0.4 % Soln  Generic drug: Hypromellose      Place 1 drop into both eyes 4 (four) times daily as needed.    Refills: 0     Bictegravir-Emtricitab-Tenofov -25 MG Tabs      Take 1 tablet by mouth daily known as: DELTASONE      Take 5 mg by mouth daily. Refills: 0     tacrolimus 1 MG Caps  Commonly known as: PROGRAF  Ask about: Which instructions should I use? Take 3 mg by mouth every morning.    Refills: 0     tacrolimus 1 MG Caps  Commonly known a spent:  > 30 minutes

## 2021-04-05 NOTE — PAYOR COMM NOTE
--------------  DISCHARGE REVIEW    Payor: Tommy ROBLEDO  Subscriber #:  [id number]  Authorization Number: [id number]    Admit date: 3/25/21  Admit time:   4:12 PM  Discharge Date: 4/3/2021  2:00 AM     Admitting Physician: Joseline Guzman MD  At for deeper infection involving hardware just above ankle       Continue Vancomycin  ---nurse reports no reactions, but patient asking for benadryl PRN  DW ortho yesterday, will need to be transferred to [facility] to see Dr. Reina Oneil     2. SP renal transplant nuclear medicine workstation.       PHARMACEUTICAL:     Indium-111, 500 uCi   21.0 mCi of technetium 99 M sulfur colloid       FINDINGS:     Increased radiotracer accumulation of indium 111 in the region of the anterior aspect of the lower shin and ankle i ·     Consultants:  • ID vasc sx    Discharge Medication List:     Discharge Medications      ASK your doctor about these medications      Instructions Prescription details   acetaminophen 500 MG Tabs  Commonly known as: TYLENOL EXTRA STRENGTH  Ask about hours. Refills: 0     Odefsey 488-47-01 MG Tabs  Generic drug: Ijiqngbjlt-Fbwaocpz-Rumvqvd AF      Take by mouth nightly. Refills: 0     omeprazole 20 MG Cpdr  Commonly known as: PRILOSEC      Take 20 mg by mouth 2 (two) times a day.    Refills: 0     O °C)  Pulse:  [66-79] 79  Resp:  [12-23] 18  BP: (126-161)/(59-75) 143/67    Physical Exam:    General: No acute distress. Respiratory: Clear to auscultation bilaterally. No wheezes. No rhonchi. Cardiovascular: S1, S2. Regular rate and rhythm.  No murmurs

## 2021-04-05 NOTE — PAYOR COMM NOTE
--------------  REQUESTING APPROVAL FOR ALL DAYS UP TO ( but not including)  4/3    PLEASE FAX  DAYS AUTHORIZED -[phone]    [facility] YOU!      4/2-3  CONTINUED STAY REVIEW    Payor: BC[employer] MEDICARE ADV PPO  Subscriber #:  [id number]  Authorization Numb transferred to [place] to see Dr. Dory Haro     2. SP renal transplant on antirejection meds     3. HIV well controlled on ART      SW:    Transfer to [place] ordered received. Called over to [facility] transfer center [phone] fax [phone].   Gave the

## 2021-04-19 NOTE — PROCEDURES
[facility]    PATIENT'S NAME: Abby Fabian   ATTENDING PHYSICIAN: Srini Brown M.D. OPERATING PHYSICIAN: Alyson Dodson M.D.    PATIENT ACCOUNT#:   [de-identified]    LOCATION:  [address] [place] [address]  MEDICAL RECORD #:   [id number]       DATE OF BIRTH:  [birth year] groin prepped and draped in the usual sterile technique.   The area was anesthetized with 1% Xylocaine and then with ultrasound guidance was punctured with a micropuncture technique, eventually with a J-wire, crossover catheter, and then a catheter placed i Kissing balloons were done across the area of the tibioperoneal trunk, posterior tibial artery going into the peroneal artery. Repeat angiogram was done and showed marked improvement of flow across these areas.   At the end of the procedure, the wires and

## 2021-04-20 NOTE — DISCHARGE SUMMARY
[facility] HOSPITALIST  DISCHARGE SUMMARY     Karyle Hilda Patient Status:  Inpatient    1965 MRN [id number]   [facility] 7NE-A Attending No att. providers found   Hosp Day # 9  [facility]     Date of Admission: 3/22/2021  Date of angioplasty rt leg, ID for possible infected hardware rt ankle and MRSA pos cx from rt ankle wound. His renal fct is stable despite receiving iodine iv dyex2. He is hemodynamically stable for transfer to medical floor at [facility].  Sign out given to Dr Jean Carlos Lee above 140   Quantity: 1 pen  Refills: 0     insulin glargine 100 UNIT/ML Soln  Commonly known as: LANTUS      Inject 20 Units into the skin nightly. Refills: 0     Keppra 500 MG Tabs  Generic drug: levETIRAcetam      Take 500 mg by mouth daily.    Refills as: AMBIEN      Take 5 mg by mouth nightly as needed for Sleep. Refills: 0            [place] Follow-up appointment:   No follow-up provider specified.   Appointments for Next 30 Days 4/20/2021 - 5/20/2021 Comment    None          Vital signs:

## 2021-04-28 ENCOUNTER — HOSPITAL ENCOUNTER (INPATIENT)
Facility: HOSPITAL | Age: 56
LOS: 1 days | Discharge: HOME HEALTH | DRG: 309 | End: 2021-04-29
Attending: EMERGENCY MEDICINE | Admitting: EMERGENCY MEDICINE
Payer: MEDICARE

## 2021-04-28 ENCOUNTER — APPOINTMENT (OUTPATIENT)
Dept: CV DIAGNOSTICS | Facility: HOSPITAL | Age: 56
DRG: 309 | End: 2021-04-28
Attending: INTERNAL MEDICINE
Payer: MEDICARE

## 2021-04-28 ENCOUNTER — APPOINTMENT (OUTPATIENT)
Dept: GENERAL RADIOLOGY | Facility: HOSPITAL | Age: 56
DRG: 309 | End: 2021-04-28
Attending: EMERGENCY MEDICINE
Payer: MEDICARE

## 2021-04-28 DIAGNOSIS — E83.42 HYPOMAGNESEMIA: ICD-10-CM

## 2021-04-28 DIAGNOSIS — I48.91 ATRIAL FIBRILLATION WITH RAPID VENTRICULAR RESPONSE (HCC): Primary | ICD-10-CM

## 2021-04-28 PROCEDURE — 93306 TTE W/DOPPLER COMPLETE: CPT | Performed by: INTERNAL MEDICINE

## 2021-04-28 PROCEDURE — 71045 X-RAY EXAM CHEST 1 VIEW: CPT | Performed by: EMERGENCY MEDICINE

## 2021-04-28 PROCEDURE — 99221 1ST HOSP IP/OBS SF/LOW 40: CPT | Performed by: INTERNAL MEDICINE

## 2021-04-28 PROCEDURE — 99223 1ST HOSP IP/OBS HIGH 75: CPT | Performed by: INTERNAL MEDICINE

## 2021-04-28 RX ORDER — PREDNISONE 1 MG/1
5 TABLET ORAL DAILY
Status: DISCONTINUED | OUTPATIENT
Start: 2021-04-28 | End: 2021-04-29

## 2021-04-28 RX ORDER — DEXTROSE MONOHYDRATE 25 G/50ML
50 INJECTION, SOLUTION INTRAVENOUS
Status: DISCONTINUED | OUTPATIENT
Start: 2021-04-28 | End: 2021-04-29

## 2021-04-28 RX ORDER — PANTOPRAZOLE SODIUM 20 MG/1
20 TABLET, DELAYED RELEASE ORAL
Status: DISCONTINUED | OUTPATIENT
Start: 2021-04-28 | End: 2021-04-29

## 2021-04-28 RX ORDER — UBIDECARENONE 75 MG
100 CAPSULE ORAL DAILY
Status: DISCONTINUED | OUTPATIENT
Start: 2021-04-28 | End: 2021-04-29

## 2021-04-28 RX ORDER — VENLAFAXINE HYDROCHLORIDE 37.5 MG/1
37.5 CAPSULE, EXTENDED RELEASE ORAL NIGHTLY
Status: DISCONTINUED | OUTPATIENT
Start: 2021-04-28 | End: 2021-04-29

## 2021-04-28 RX ORDER — METOPROLOL TARTRATE 50 MG/1
50 TABLET, FILM COATED ORAL
Status: DISCONTINUED | OUTPATIENT
Start: 2021-04-28 | End: 2021-04-29

## 2021-04-28 RX ORDER — TACROLIMUS 0.5 MG/1
2 CAPSULE ORAL EVERY EVENING
Status: DISCONTINUED | OUTPATIENT
Start: 2021-04-28 | End: 2021-04-29

## 2021-04-28 RX ORDER — BISACODYL 10 MG
10 SUPPOSITORY, RECTAL RECTAL
Status: DISCONTINUED | OUTPATIENT
Start: 2021-04-28 | End: 2021-04-29

## 2021-04-28 RX ORDER — ACYCLOVIR 200 MG/1
200 CAPSULE ORAL 2 TIMES DAILY
Status: DISCONTINUED | OUTPATIENT
Start: 2021-04-28 | End: 2021-04-29

## 2021-04-28 RX ORDER — MAGNESIUM SULFATE HEPTAHYDRATE 40 MG/ML
2 INJECTION, SOLUTION INTRAVENOUS ONCE
Status: COMPLETED | OUTPATIENT
Start: 2021-04-28 | End: 2021-04-28

## 2021-04-28 RX ORDER — ACETAMINOPHEN 325 MG/1
650 TABLET ORAL EVERY 6 HOURS PRN
Status: DISCONTINUED | OUTPATIENT
Start: 2021-04-28 | End: 2021-04-29

## 2021-04-28 RX ORDER — ZOLPIDEM TARTRATE 5 MG/1
5 TABLET ORAL NIGHTLY PRN
Status: DISCONTINUED | OUTPATIENT
Start: 2021-04-28 | End: 2021-04-29

## 2021-04-28 RX ORDER — ONDANSETRON 2 MG/ML
4 INJECTION INTRAMUSCULAR; INTRAVENOUS ONCE
Status: COMPLETED | OUTPATIENT
Start: 2021-04-28 | End: 2021-04-28

## 2021-04-28 RX ORDER — ONDANSETRON 2 MG/ML
4 INJECTION INTRAMUSCULAR; INTRAVENOUS EVERY 6 HOURS PRN
Status: DISCONTINUED | OUTPATIENT
Start: 2021-04-28 | End: 2021-04-29

## 2021-04-28 RX ORDER — ATORVASTATIN CALCIUM 10 MG/1
10 TABLET, FILM COATED ORAL NIGHTLY
Status: DISCONTINUED | OUTPATIENT
Start: 2021-04-28 | End: 2021-04-29

## 2021-04-28 RX ORDER — SODIUM PHOSPHATE, DIBASIC AND SODIUM PHOSPHATE, MONOBASIC 7; 19 G/133ML; G/133ML
1 ENEMA RECTAL ONCE AS NEEDED
Status: DISCONTINUED | OUTPATIENT
Start: 2021-04-28 | End: 2021-04-29

## 2021-04-28 RX ORDER — TACROLIMUS 1 MG/1
3 CAPSULE ORAL EVERY MORNING
Status: DISCONTINUED | OUTPATIENT
Start: 2021-04-28 | End: 2021-04-29

## 2021-04-28 RX ORDER — MAGNESIUM OXIDE 400 MG (241.3 MG MAGNESIUM) TABLET
800 TABLET ONCE
Status: COMPLETED | OUTPATIENT
Start: 2021-04-28 | End: 2021-04-28

## 2021-04-28 RX ORDER — MELATONIN
3 NIGHTLY PRN
Status: DISCONTINUED | OUTPATIENT
Start: 2021-04-28 | End: 2021-04-29

## 2021-04-28 RX ORDER — GLIMEPIRIDE 2 MG/1
1 TABLET ORAL 2 TIMES DAILY
Status: DISCONTINUED | OUTPATIENT
Start: 2021-04-28 | End: 2021-04-28 | Stop reason: ALTCHOICE

## 2021-04-28 RX ORDER — POLYETHYLENE GLYCOL 3350 17 G/17G
17 POWDER, FOR SOLUTION ORAL DAILY PRN
Status: DISCONTINUED | OUTPATIENT
Start: 2021-04-28 | End: 2021-04-29

## 2021-04-28 RX ORDER — MYCOPHENOLIC ACID 360 MG/1
360 TABLET, DELAYED RELEASE ORAL EVERY 12 HOURS SCHEDULED
Status: DISCONTINUED | OUTPATIENT
Start: 2021-04-28 | End: 2021-04-29

## 2021-04-28 RX ORDER — POLYETHYLENE GLYCOL 3350 17 G/17G
17 POWDER, FOR SOLUTION ORAL DAILY
Status: DISCONTINUED | OUTPATIENT
Start: 2021-04-28 | End: 2021-04-29

## 2021-04-28 RX ORDER — CALCIUM CARBONATE/VITAMIN D3 250-3.125
2 TABLET ORAL 2 TIMES DAILY
Status: DISCONTINUED | OUTPATIENT
Start: 2021-04-28 | End: 2021-04-29

## 2021-04-28 RX ORDER — DOCUSATE SODIUM 100 MG/1
100 CAPSULE, LIQUID FILLED ORAL NIGHTLY
Status: DISCONTINUED | OUTPATIENT
Start: 2021-04-28 | End: 2021-04-29

## 2021-04-28 RX ORDER — LEVETIRACETAM 500 MG/1
500 TABLET ORAL DAILY
Status: DISCONTINUED | OUTPATIENT
Start: 2021-04-28 | End: 2021-04-29

## 2021-04-28 NOTE — CONSULTS
[facility]  Report of Inpatient Wound Care Consultation    Rima Ramirez Patient Status:  Inpatient    1965 MRN [id number]   [facility] 7NE-A Attending Iliana Ríos MD   Hosp Day # 0 [facility] 608 [facility]     Reason for Consultat dialysis - right arm fistula   • OTHER      2008 rt arm fistula placed   • OTHER      LUCIUS knees   • OTHER SURGICAL HISTORY  3/13/14    Right patellar tendon repair   • UPPER GI ENDOSCOPY - REFERRAL  10/2015    hiat hernia/schatzki's ring.  no hpylori. mild Ashley RN, BSN [address] [place] [address],[address]   Wound Care pager [phone]  4/28/2021  11:58 AM

## 2021-04-28 NOTE — ED QUICK NOTES
Pt with existing wound to right foot, wound vac in place.  Suction on wound vac has been off x 1-2 hours per pateint

## 2021-04-28 NOTE — PROGRESS NOTES
Cardiology addendum    Echo unchanged for 2018 EF 65-70% bicuspid aortic valve mild aortic stenosis, normal RV. Reviewed with Dr. Rikki Enamorado will plan on [facility] Nuclear tomorrow.     Belle Veliz NP

## 2021-04-28 NOTE — PLAN OF CARE
Admitted CTU 7 0430  Patient alert and oriented x4  R leg wound vac- removed wet to dry applied- wound care to see  Denies pain  afib- cardizem drip infusing into L picc line

## 2021-04-28 NOTE — DIETARY NOTE
[address] [place] [address]     Admitting diagnosis:  Hypomagnesemia [E83.42]  Atrial fibrillation with rapid ventricular response ([address] [I48.91]    Ht: 172.7 cm (5' 8\")  Wt: 83.9 kg (185 lb). Body mass index is 28.13 kg/m².

## 2021-04-28 NOTE — PAYOR COMM NOTE
--------------  ADMISSION REVIEW     Payor: [employer]BS MEDICARE ADV PPO  Subscriber #:  [id number]  Authorization Number: [id number]    Admit date: 4/28/21  Admit time:  4:29 AM     Patient Seen in: [facility] Emergency Department    History   Patient p following components:    Pro-Beta Natriuretic Peptide 231 (*)     All other components within normal limits   MAGNESIUM - Abnormal; Notable for the following components:    Magnesium 1.4 (*)     All other components within normal limits   CBC W/ DIFFERENTI yesterday around 3pm, but denies any abdominal pain, diarrhea, fevers/chills.      Physical Exam:    /63   Pulse (!) 132   Temp 97 °F (36.1 °C) (Temporal)   Resp 21   Ht 5' 8\" (1.727 m)   Wt 185 lb (83.9 kg)   SpO2 100%   BMI 28.13 kg/m²     HEENT: N disorder  1. keppra  6. HTN  1. amlodipine  7. HLD  1. statin  8. ESRD s/p renal transplant  1. Tacrolimus, mycophenolate, prednisone    Quality:  · DVT Prophylaxis: Eliquis      CARDIOLOGY  Assessment:  · AFib with RVR, rates on IV Cardizem.   Uncertain of Metoprolol Tartrate (LOPRESSOR) tab 50 mg     Date Action Dose Route User    4/28/2021 1211 Given 50 mg Oral Apryl Olvera RN      mycophenolate sodium (MYFORTIC) EC tab 360 mg     Date Action Dose Route User    4/28/2021 1238 Given 360 mg Oral Sebastián Bhatia

## 2021-04-28 NOTE — CM/SW NOTE
04/28/21 1500   CM/SW Referral Data   Referral Source Physician   Reason for Referral Discharge planning   Informant Patient   Patient Info   Patient's Mental Status Alert;Oriented   Patient's [address]   Number of Levels in Home 1   Mandeep

## 2021-04-28 NOTE — CONSULTS
[facility] Cardiology Consult    Reason for Consultation:  Atrial fibrillation rapid ventricular response    History of Present Illness:  Felipe Gann is a 54year old male with known PAF, HTN, PAD, HIV and post kidney transplant.   Yesterday Past Surgical History:   Procedure Laterality Date   • ANESTH,KIDNEY TRANSPLANT     • APPENDECTOMY     • AV FISTULA REVISION, OPEN     • KIDNEY TRANSPLANT EVALUATION      right kidney   • OTHER      tooth extraction   • OTHER      right sided dialysis Carbonate-Vitamin D  2 tablet Oral BID   • Vitamin D3 (Cholecalciferol)  5,000 Units Oral Daily      Vital signs/telemetry:  /48 (BP Location: Left arm)   Pulse 110   Temp 98.2 °F (36.8 °C) (Oral)   Resp 18   Ht 5' 8\" (1.727 m)   Wt 185 lb (83.9 kg) Electrophysiolgy  [phone]

## 2021-04-28 NOTE — ED PROVIDER NOTES
Patient Seen in: [facility] Emergency Department      History   Patient presents with:  Chest Pain Angina    Stated Complaint: chest pain    HPI/Subjective:   HPI    Patient is a pleasant 51-year-old male with extensive medical history including par • AV FISTULA REVISION, OPEN     • KIDNEY TRANSPLANT EVALUATION      right kidney   • OTHER      tooth extraction   • OTHER      right sided dialysis - right arm fistula   • OTHER      2008 rt arm fistula placed   • OTHER      LUCIUS knees   • OTHER SURGICA Mental Status: He is alert and oriented to person, place, and time.             ED Course     Labs Reviewed   COMP METABOLIC PANEL (14) - Abnormal; Notable for the following components:       Result Value    Glucose 206 (*)     AST 13 (*)     Alkaline arrival.  Noted to be in rapid atrial fibrillation on arrival here which is likely explanation for the symptoms. He was recently started on amlodipine which was a new medication for him, denies any other new medications.   He is being treated with vancomyc

## 2021-04-28 NOTE — H&P
[facility] HOSPITALIST  History and Physical     Melissa Carrillo Patient Status:  Emergency    1965 MRN [id number]   Location 6[facility] Attending Maurisio Hager MD   Hosp Day # 0 PCP [facility]     Chief Complaint: n/v, treated chemo in 1999-  no problem since   • Muscle weakness     fractured left tibia   • Other and unspecified hyperlipidemia    • Pneumonia due to organism     pcp pneumonia   • Right patella fracture     03/06/14- fell-   knee immobilizer- wheelchair tacrolimus 1 MG Oral Cap, Take 2 mg by mouth every evening., Disp: , Rfl:   apixaban 5 MG Oral Tab, Take 5 mg by mouth every 12 (twelve) hours. , Disp: , Rfl:   acetaminophen 500 MG Oral Tab, Take 500 mg by mouth every 6 (six) hours as needed for Pain., D Oral Cap, Take 5,000 Units by mouth daily. , Disp: , Rfl:   insulin glargine 100 UNIT/ML Subcutaneous Solution, Inject 20 Units into the skin nightly., Disp: , Rfl:   Timolol Maleate 0.25 % Ophthalmic Solution, Place 1 drop into both eyes 2 (two) times vern affect. Diagnostic Data:      Labs:  Recent Labs   Lab 04/28/21 0143   WBC 10.1   HGB 12.3*   MCV 94.4   .0       Recent Labs   Lab 04/28/21 0143   *   BUN 17   CREATSERUM 0.96   GFRAA 102   GFRNAA 89   CA 9.3   ALB 3.5      K 3. DO  4/28/2021

## 2021-04-29 ENCOUNTER — APPOINTMENT (OUTPATIENT)
Dept: CV DIAGNOSTICS | Facility: HOSPITAL | Age: 56
DRG: 309 | End: 2021-04-29
Attending: INTERNAL MEDICINE
Payer: MEDICARE

## 2021-04-29 VITALS
OXYGEN SATURATION: 97 % | HEIGHT: 68 IN | BODY MASS INDEX: 28.04 KG/M2 | WEIGHT: 185 LBS | RESPIRATION RATE: 17 BRPM | HEART RATE: 63 BPM | DIASTOLIC BLOOD PRESSURE: 55 MMHG | TEMPERATURE: 98 F | SYSTOLIC BLOOD PRESSURE: 137 MMHG

## 2021-04-29 PROBLEM — E11.9 DIABETES MELLITUS TYPE 2 IN NONOBESE (HCC): Status: ACTIVE | Noted: 2017-11-23

## 2021-04-29 PROCEDURE — 93018 CV STRESS TEST I&R ONLY: CPT | Performed by: INTERNAL MEDICINE

## 2021-04-29 PROCEDURE — 99232 SBSQ HOSP IP/OBS MODERATE 35: CPT | Performed by: INTERNAL MEDICINE

## 2021-04-29 PROCEDURE — 78452 HT MUSCLE IMAGE SPECT MULT: CPT | Performed by: INTERNAL MEDICINE

## 2021-04-29 PROCEDURE — 99239 HOSP IP/OBS DSCHRG MGMT >30: CPT | Performed by: HOSPITALIST

## 2021-04-29 PROCEDURE — 93017 CV STRESS TEST TRACING ONLY: CPT | Performed by: INTERNAL MEDICINE

## 2021-04-29 RX ORDER — VANCOMYCIN 2 GRAM/500 ML IN 0.9 % SODIUM CHLORIDE INTRAVENOUS
25 ONCE
Status: COMPLETED | OUTPATIENT
Start: 2021-04-29 | End: 2021-04-29

## 2021-04-29 RX ORDER — MAGNESIUM OXIDE 400 MG (241.3 MG MAGNESIUM) TABLET
400 TABLET ONCE
Status: COMPLETED | OUTPATIENT
Start: 2021-04-29 | End: 2021-04-29

## 2021-04-29 RX ORDER — DIPHENHYDRAMINE HCL 50 MG
50 CAPSULE ORAL ONCE
Status: COMPLETED | OUTPATIENT
Start: 2021-04-29 | End: 2021-04-29

## 2021-04-29 RX ORDER — DIPHENHYDRAMINE HCL 25 MG
25 CAPSULE ORAL ONCE
Status: DISCONTINUED | OUTPATIENT
Start: 2021-04-29 | End: 2021-04-29

## 2021-04-29 RX ORDER — VANCOMYCIN HYDROCHLORIDE
15 EVERY 24 HOURS
Status: DISCONTINUED | OUTPATIENT
Start: 2021-04-30 | End: 2021-04-29

## 2021-04-29 NOTE — PLAN OF CARE
Preliminary NST shows EF 68%, perfusion negative    Per dr lundberg's note, no further cardiac testing needed. Cardiology will sign off.

## 2021-04-29 NOTE — PROGRESS NOTES
Patient seen and examined. Medically clear to discharge today. Back in NSR. ST neg. Plan to re-dose IV vancomycin prior to DC.      Claire Valladares MD

## 2021-04-29 NOTE — PLAN OF CARE
Assumed care @ 1930  Pt AOx4. NSR on tele. R knee and lower leg dressing c/d/i  HIV meds from home recived from pharm and given to pt.    No complaints of pain  Concerns of ride home upon dc, and vanco dose that he normally receives at home  Stress test

## 2021-04-29 NOTE — CM/SW NOTE
Pt is ready for dc today. Pt will go home and continue with [facility] with whom he is current. [facility] [phone]. Called [facility] to let thm know pt will be d/c'd home today - resume [facility] orders and updates sent.   Wound instructions

## 2021-04-29 NOTE — CONSULTS
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

## 2021-04-29 NOTE — PLAN OF CARE
Assumed care at 0700  Patient alert, oriented x4  Room air throughout day  Echo completed  Per wound care, no wound vac needed. Dressings changed  cardizem gtt off this afternoon. Converted to NSR this evening  HIV meds brought in tonight.  Endorsed to Juan

## 2021-04-29 NOTE — PAYOR COMM NOTE
--------------  CONTINUED STAY REVIEW    Payor: [employer] ADV PPO  Subscriber #:  [id number]  Authorization Number: [id number]    Admit date: 4/28/21  Admit time:  4:29 AM    FAXING CLINICAL UPDATE FOR 4/29/21 4/29/21   CARDIOLOGY  No chest pain o 5,000 Units Oral Gabriele Ordonez, RN      docusate sodium (COLACE) cap 100 mg     Date Action Dose Route User    4/28/2021 2210 Given 100 mg Oral Bruna Gonzalez RN      Doravirine TABS 100 mg     Date Action Dose Route User    4/28/2021 2211 Given 100 mg Or 5920 Given 20 mg Oral Zachary Olivier RN      predniSONE (DELTASONE) tab 5 mg     Date Action Dose Route User    4/29/2021 1145 Given 5 mg Oral Gabriele Ordonez RN            tacrolimus (PROGRAF) cap 2 mg     Date Action Dose Route User    4/28/2021 1827 Giv

## 2021-04-29 NOTE — PROGRESS NOTES
CARDIACDIAGNOSTICS NOTE      Inpatient nuclear Lexiscan stress test ordered by [facility] Cardiologist Dr. Rosina Allen. [facility] Cardiologist Dr. Roverto Verdin in department for patient evaluation of progress.     Stationary Lexiscan protocol implemented, with 0.4m

## 2021-04-29 NOTE — PROGRESS NOTES
[facility] Cardiology Progress Note    Patient seen and examined.  Chart reviewed.      No chest pain or shortness of breath.     /39 (BP Location: Left arm)   Pulse 68   Temp 98.4 °F (36.9 °C) (Oral)   Resp 14   Ht 68\"   Wt 185 lb (83.9 kg)   SpO2 96%   BM GM/118ML enema 133 mL, 1 enema, Rectal, Once PRN  ondansetron HCl (ZOFRAN) injection 4 mg, 4 mg, Intravenous, Q6H PRN  Insulin Aspart Pen (NOVOLOG) 100 UNIT/ML flexpen 1-10 Units, 1-10 Units, Subcutaneous, TID AC and HS  levETIRAcetam (KEPPRA) tab 500 mg, BID.  2. HTN - statin therapy      Will follow,    Elton Martinez MD

## 2021-04-29 NOTE — PLAN OF CARE
Assumed care this am  Pt alert and oriented. Remains in NSR. No acute issues during shift  Pt to stress test. No further cardiology work up, s/o  Pt to get loading dose of vancomycin inpatient then dc home.    Discussed dc instructions, medications, and fol

## 2021-04-30 ENCOUNTER — PATIENT OUTREACH (OUTPATIENT)
Dept: CASE MANAGEMENT | Age: 56
End: 2021-04-30

## 2021-04-30 NOTE — DISCHARGE SUMMARY
[facility] HOSPITALIST  DISCHARGE SUMMARY     Alma Gomez Patient Status:  Inpatient    1965 MRN [id number]   [facility] 7NE-A Attending No att. providers found   Hosp Day # 1  [facility]     Date of Admission: 2021  Date of chronically for his MRSA infection in his right tibia. He had mild chest pain which is likely secondary to atrial fibrillation. He had a stress test which was negative for perfusion deficits.   He was stable for discharge with plans to follow-up with his meals. 1 unit per 10g carbs plus 1 unit for every 20 points above 140   Quantity: 1 pen  Refills: 0     insulin glargine 100 UNIT/ML Soln  Commonly known as: LANTUS      Inject 20 Units into the skin nightly.    Refills: 0     Keppra 500 MG Tabs  Generic dr Vdepqzzluw-Qwnjpzwy-Qoyvatp AF               [place]    Follow-up appointment:   Isabel Felicia  [address] [address] [phone]    Schedule an appointment as soon as possible for a visit      Appointments for

## 2021-04-30 NOTE — PAYOR COMM NOTE
--------------  DISCHARGE REVIEW    Payor: Juan MULLINS PPO  Subscriber #:  [id number]  Authorization Number: [id number]    Admit date: 4/28/21  Admit time:   4:29 AM  Discharge Date: 4/29/2021  8:17 PM     Admitting Physician:   Attending Physician diaphoresis, SOB/ACEVEDO, lightheadedness. He recently had his vanc dosage increased, but otherwise felt that he was in his normal state of health preceding this event.  He is getting vanc infusions daily for MRSA infection with plan for another 4 weeks of juan jose Artificial Tears 0.4 % Soln  Generic drug: Hypromellose      Place 1 drop into both eyes 4 (four) times daily as needed. Refills: 0     Bictegravir-Emtricitab-Tenofov -25 MG Tabs      Take 1 tablet by mouth daily.    Refills: 0     cadexomer iodin Vancomycin 1250 mg IVPB daily. Started on 3/31/21 for total of 6 weeks of therapy. Refills: 0     Venlafaxine HCl ER 37.5 MG Cp24  Commonly known as: EFFEXOR-XR      Take 37.5 mg by mouth nightly.    Refills: 0     Vitamin B12 100 MCG Tabs  Commonly known

## 2021-04-30 NOTE — PROGRESS NOTES
1st attempt to contact patient to [facility] hosp fup for DM-attempt to call pt to LM but mailbox is full-will try later      [facility] Schedule an appointment as soon as possible for a visit  [facility]   [facility]

## 2021-04-30 NOTE — CM/SW NOTE
04/30/21 1000   Discharge disposition   Expected discharge disposition Home-Health   Name of Facillity/Home Care/Hospice   ([facility])   Outpatient services   (IV [facility] thru the [address] [facility] [address] [facility])   Home services after discharge Skilled home care   Discharge transp

## 2021-05-03 NOTE — PROGRESS NOTES
2nd attempt to assist patient in sched DM fup-I spoke w/patient and he said he would sched appt himself    Dr. Ladi Larios  [address] [facility]  [phone]

## (undated) NOTE — LETTER
Holly Nelson 89  [address] [facility] [place] [facility] [address], [address] [facility]  Authorization for Surgical Operation and Procedure     Date:___________                                                                                                         Time:__________ reactions, hemolytic reactions, transmission of diseases such as Hepatitis, AIDS and Cytomegalovirus (CMV) and fluid overload. In the event that I wish to have an autologous transfusion of my own blood, or a directed donor transfusion.   I will discuss thi ends for purposes of reinstating the DNAR order.   10. Patients having a sterilization procedure: I understand that if the procedure is successful the results will be permanent and it will therefore be impossible for me to inseminate, conceive, or bear chil procedures as necessary. Some examples are: Starting or using an “IV” to give me medicine, fluids or blood during my procedure, and having a breathing tube placed to help me breathe when I’m asleep (intubation).  In the event that my heart stops working pro include headache, bleeding, infection, seizure, irregular heart rhythms, and nerve injury.     I can change my mind about having anesthesia services at any time before I get the medicine.    __________________________________________________________________

## (undated) NOTE — ED AVS SNAPSHOT
Romain Colleen   MRN: [id number]    Department:  [facility] Emergency Department   Date of Visit:  7/27/2018           Disclosure     Insurance plans vary and the physician(s) referred by the ER may not be covered by your plan.  Please contact you tell this physician (or your personal doctor if your instructions are to return to your personal doctor) about any new or lasting problems. The primary care or specialist physician will see patients referred from the [facility] Emergency Department.  Wing Bryant

## (undated) NOTE — LETTER
Date & Time: 11/23/2019, 10:11 PM  Patient: Sully De Oliveira  Encounter Provider(s):    Heath Quigley DO       Occupational restrictions  For: Sully De Oliveira    Normal weight limits  Normal arm and hand activities  Elevate right leg  Sitting work

## (undated) NOTE — LETTER
Date & Time: 7/27/2018, 8:40 PM  Patient: Victoriano Muniz  Encounter Provider(s):    Eder Harkins MD  [place], [place]       To Whom It May Concern:    Victoriano Muniz was seen and treated in our department on 7/27/2018.  He should not return t

## (undated) NOTE — ED AVS SNAPSHOT
Alda Chan   MRN: [id number]    Department:  [facility] Emergency Department   Date of Visit:  12/16/2019           Disclosure     Insurance plans vary and the physician(s) referred by the ER may not be covered by your plan.  Please contact yo tell this physician (or your personal doctor if your instructions are to return to your personal doctor) about any new or lasting problems. The primary care or specialist physician will see patients referred from the [facility] Emergency Department.  Fab Qiu
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
## (undated) NOTE — ED AVS SNAPSHOT
Saintclair Countryman   MRN: [id number]    Department:  [facility] Emergency Department   Date of Visit:  4/4/2019           Disclosure     Insurance plans vary and the physician(s) referred by the ER may not be covered by your plan.  Please contact your tell this physician (or your personal doctor if your instructions are to return to your personal doctor) about any new or lasting problems. The primary care or specialist physician will see patients referred from the [facility] Emergency Department.  Salvadore Skiff

## (undated) NOTE — ED AVS SNAPSHOT
Kacy Joya   MRN: [id number]    Department:  [facility] Emergency Department   Date of Visit:  10/21/2017           Disclosure     Insurance plans vary and the physician(s) referred by the ER may not be covered by your plan.  Please contact yo If you have been prescribed any medication(s), please fill your prescription right away and begin taking the medication(s) as directed    If the emergency physician has read X-rays, these will be re-interpreted by a radiologist.  If there is a significant

## (undated) NOTE — LETTER
Date & Time: 2/16/2020, 1:59 PM  Patient: Rafiq Oro  Encounter Provider(s):    Mariel Mcgowan MD       To Whom It May Concern:    Rafiq Oro was seen and treated in our department on 2/16/2020.  He can return to work with these limitations:

## (undated) NOTE — LETTER
[facility] [address], [address]  Consent for Procedure/Sedation    Date: 03/30/2021    Time: 0741      1.  I authorize the performance upon Sharad Sender the following: Peripheral angiography, atherectomy, percutaneous translum Relationship to  to consent for patient: ___________________________   patient: ___________________    Witness: ______________________________________  Date: _____________________    Patient Name: Karyle Hilda YOB: 1965                 Pr

## (undated) NOTE — ED AVS SNAPSHOT
Sharad Sender   MRN: [id number]    Department:  [facility] Emergency Department   Date of Visit:  3/18/2020           Disclosure     Insurance plans vary and the physician(s) referred by the ER may not be covered by your plan.  Please contact you tell this physician (or your personal doctor if your instructions are to return to your personal doctor) about any new or lasting problems. The primary care or specialist physician will see patients referred from the [facility] Emergency Department.  Ezra Agarwal

## (undated) NOTE — ED AVS SNAPSHOT
Jonny Franklin   MRN: [id number]    Department:  [facility] Emergency Department   Date of Visit:  11/23/2019           Disclosure     Insurance plans vary and the physician(s) referred by the ER may not be covered by your plan.  Please contact yo tell this physician (or your personal doctor if your instructions are to return to your personal doctor) about any new or lasting problems. The primary care or specialist physician will see patients referred from the [facility] Emergency Department.  Ezra Agarwal

## (undated) NOTE — ED AVS SNAPSHOT
Fernanda Hogan   MRN: [id number]    Department:  [facility] Emergency Department   Date of Visit:  8/24/2017           Disclosure     Insurance plans vary and the physician(s) referred by the ER may not be covered by your plan.  Please contact you If you have been prescribed any medication(s), please fill your prescription right away and begin taking the medication(s) as directed    If the emergency physician has read X-rays, these will be re-interpreted by a radiologist.  If there is a significant

## (undated) NOTE — ED AVS SNAPSHOT
Obed Alonzo   MRN: [id number]    Department:  [facility] Emergency Department   Date of Visit:  6/14/2018           Disclosure     Insurance plans vary and the physician(s) referred by the ER may not be covered by your plan.  Please contact you tell this physician (or your personal doctor if your instructions are to return to your personal doctor) about any new or lasting problems. The primary care or specialist physician will see patients referred from the [facility] Emergency Department.  Ilan Patel

## (undated) NOTE — LETTER
Date & Time: 11/23/2019, 10:10 PM  Patient: Percy Cardona  Encounter Provider(s):    Jc Palma DO       To Whom It May Concern:    Percy Cardona was seen and treated in our department on 11/23/2019.  He should not return to work until 11/2

## (undated) NOTE — ED AVS SNAPSHOT
Hoang Gil   MRN: [id number]    Department:  [facility] Emergency Department   Date of Visit:  2/16/2020           Disclosure     Insurance plans vary and the physician(s) referred by the ER may not be covered by your plan.  Please contact you tell this physician (or your personal doctor if your instructions are to return to your personal doctor) about any new or lasting problems. The primary care or specialist physician will see patients referred from the [facility] Emergency Department.  Charles Serna

## (undated) NOTE — ED AVS SNAPSHOT
Rima Ramirez   MRN: [id number]    Department:  [facility] Emergency Department   Date of Visit:  6/7/2019           Disclosure     Insurance plans vary and the physician(s) referred by the ER may not be covered by your plan.  Please contact your tell this physician (or your personal doctor if your instructions are to return to your personal doctor) about any new or lasting problems. The primary care or specialist physician will see patients referred from the [facility] Emergency Department.  Arpan Ferrer

## (undated) NOTE — ED AVS SNAPSHOT
Razjose angel Francalla   MRN: [id number]    Department:  [facility] Emergency Department   Date of Visit:  8/8/2017           Disclosure     Insurance plans vary and the physician(s) referred by the ER may not be covered by your plan.  Please contact your If you have been prescribed any medication(s), please fill your prescription right away and begin taking the medication(s) as directed    If the emergency physician has read X-rays, these will be re-interpreted by a radiologist.  If there is a significant

## (undated) NOTE — LETTER
Holly Nelson 182  [address] [facility] [address], [address] [facility]  Authorization for Surgical Operation and Procedure     Date:___________                                                                                                         Time:__________ allergic reactions, hemolytic reactions, transmission of diseases such as Hepatitis, AIDS and Cytomegalovirus (CMV) and fluid overload. In the event that I wish to have an autologous transfusion of my own blood, or a directed donor transfusion.   I will di period ends for purposes of reinstating the DNAR order.   10. Patients having a sterilization procedure: I understand that if the procedure is successful the results will be permanent and it will therefore be impossible for me to inseminate, conceive, or be additional procedures as necessary. Some examples are: Starting or using an “IV” to give me medicine, fluids or blood during my procedure, and having a breathing tube placed to help me breathe when I’m asleep (intubation).  In the event that my heart stops complications include headache, bleeding, infection, seizure, irregular heart rhythms, and nerve injury.     I can change my mind about having anesthesia services at any time before I get the medicine.    ____________________________________________________

## (undated) NOTE — IP AVS SNAPSHOT
[address]            (For Outpatient Use Only) Initial Admit Date: 3/22/2021   Inpt/Obs Admit Date: Inpt: 3/25/21 / Obs: 03/22/21   Discharge Date:    Jermain Binet:  [de-identified]   MRN: [de-identified]   CSN: [id number]   CEID: [id number] Insurance Type:    Subscriber Name:  Subscriber :    Subscriber ID:  Pt Rel to Subscriber:    Hospital Account Financial Class: Medicare Advantage    2021